# Patient Record
Sex: FEMALE | Race: BLACK OR AFRICAN AMERICAN | NOT HISPANIC OR LATINO | Employment: OTHER | URBAN - METROPOLITAN AREA
[De-identification: names, ages, dates, MRNs, and addresses within clinical notes are randomized per-mention and may not be internally consistent; named-entity substitution may affect disease eponyms.]

---

## 2021-01-01 ENCOUNTER — HOSPITAL ENCOUNTER (INPATIENT)
Facility: HOSPITAL | Age: 56
LOS: 8 days | Discharge: NURSING, REHAB OR LONG-TERM CARE | End: 2021-11-19
Attending: EMERGENCY MEDICINE | Admitting: INTERNAL MEDICINE

## 2021-01-01 ENCOUNTER — ANESTHESIA EVENT (OUTPATIENT)
Dept: GASTROENTEROLOGY | Facility: HOSPITAL | Age: 56
End: 2021-01-01

## 2021-01-01 ENCOUNTER — TELEPHONE (OUTPATIENT)
Dept: GASTROENTEROLOGY | Facility: CLINIC | Age: 56
End: 2021-01-01

## 2021-01-01 ENCOUNTER — HOSPITAL ENCOUNTER (EMERGENCY)
Facility: HOSPITAL | Age: 56
Discharge: HOME | End: 2021-11-27
Attending: EMERGENCY MEDICINE | Admitting: EMERGENCY MEDICINE

## 2021-01-01 ENCOUNTER — ANESTHESIA (OUTPATIENT)
Dept: GASTROENTEROLOGY | Facility: HOSPITAL | Age: 56
End: 2021-01-01

## 2021-01-01 ENCOUNTER — APPOINTMENT (OUTPATIENT)
Dept: CT IMAGING | Facility: HOSPITAL | Age: 56
End: 2021-01-01

## 2021-01-01 ENCOUNTER — APPOINTMENT (OUTPATIENT)
Dept: GENERAL RADIOLOGY | Facility: HOSPITAL | Age: 56
End: 2021-01-01

## 2021-01-01 VITALS
BODY MASS INDEX: 24.91 KG/M2 | DIASTOLIC BLOOD PRESSURE: 92 MMHG | WEIGHT: 135.36 LBS | HEIGHT: 62 IN | HEART RATE: 110 BPM | SYSTOLIC BLOOD PRESSURE: 112 MMHG | RESPIRATION RATE: 20 BRPM | TEMPERATURE: 98.4 F | OXYGEN SATURATION: 99 %

## 2021-01-01 VITALS
OXYGEN SATURATION: 96 % | HEART RATE: 106 BPM | SYSTOLIC BLOOD PRESSURE: 124 MMHG | WEIGHT: 142.2 LBS | DIASTOLIC BLOOD PRESSURE: 84 MMHG | TEMPERATURE: 98.8 F | HEIGHT: 62 IN | RESPIRATION RATE: 16 BRPM | BODY MASS INDEX: 26.17 KG/M2

## 2021-01-01 DIAGNOSIS — K60.2 ANAL FISSURE: Primary | ICD-10-CM

## 2021-01-01 DIAGNOSIS — R26.2 DIFFICULTY WALKING: ICD-10-CM

## 2021-01-01 DIAGNOSIS — Z78.9 DECREASED ACTIVITIES OF DAILY LIVING (ADL): ICD-10-CM

## 2021-01-01 DIAGNOSIS — K62.5 RECTAL BLEEDING: Primary | ICD-10-CM

## 2021-01-01 LAB
ABO GROUP BLD: NORMAL
ABO GROUP BLD: NORMAL
ALBUMIN SERPL-MCNC: 2.5 G/DL (ref 3.5–5.2)
ALBUMIN SERPL-MCNC: 2.7 G/DL (ref 3.5–5.2)
ALBUMIN SERPL-MCNC: 2.9 G/DL (ref 3.5–5.2)
ALBUMIN SERPL-MCNC: 3 G/DL (ref 3.5–5.2)
ALBUMIN SERPL-MCNC: 3.5 G/DL (ref 3.5–5.2)
ALBUMIN/GLOB SERPL: 1.4 G/DL
ALBUMIN/GLOB SERPL: 1.5 G/DL
ALBUMIN/GLOB SERPL: 1.6 G/DL
ALBUMIN/GLOB SERPL: 1.6 G/DL
ALBUMIN/GLOB SERPL: 1.7 G/DL
ALP SERPL-CCNC: 54 U/L (ref 39–117)
ALP SERPL-CCNC: 56 U/L (ref 39–117)
ALP SERPL-CCNC: 59 U/L (ref 39–117)
ALP SERPL-CCNC: 59 U/L (ref 39–117)
ALP SERPL-CCNC: 79 U/L (ref 39–117)
ALT SERPL W P-5'-P-CCNC: 122 U/L (ref 1–33)
ALT SERPL W P-5'-P-CCNC: 132 U/L (ref 1–33)
ALT SERPL W P-5'-P-CCNC: 133 U/L (ref 1–33)
ALT SERPL W P-5'-P-CCNC: 135 U/L (ref 1–33)
ALT SERPL W P-5'-P-CCNC: 149 U/L (ref 1–33)
ANION GAP SERPL CALCULATED.3IONS-SCNC: 10.1 MMOL/L (ref 5–15)
ANION GAP SERPL CALCULATED.3IONS-SCNC: 10.2 MMOL/L (ref 5–15)
ANION GAP SERPL CALCULATED.3IONS-SCNC: 10.4 MMOL/L (ref 5–15)
ANION GAP SERPL CALCULATED.3IONS-SCNC: 12.5 MMOL/L (ref 5–15)
ANION GAP SERPL CALCULATED.3IONS-SCNC: 5.7 MMOL/L (ref 5–15)
ANION GAP SERPL CALCULATED.3IONS-SCNC: 6.4 MMOL/L (ref 5–15)
ANION GAP SERPL CALCULATED.3IONS-SCNC: 8.4 MMOL/L (ref 5–15)
ANION GAP SERPL CALCULATED.3IONS-SCNC: 8.7 MMOL/L (ref 5–15)
APTT PPP: 18.5 SECONDS (ref 22.2–34.2)
AST SERPL-CCNC: 100 U/L (ref 1–32)
AST SERPL-CCNC: 120 U/L (ref 1–32)
AST SERPL-CCNC: 80 U/L (ref 1–32)
AST SERPL-CCNC: 91 U/L (ref 1–32)
AST SERPL-CCNC: 94 U/L (ref 1–32)
BACTERIA SPEC AEROBE CULT: ABNORMAL
BACTERIA SPEC AEROBE CULT: NORMAL
BACTERIA SPEC AEROBE CULT: NORMAL
BACTERIA UR QL AUTO: ABNORMAL /HPF
BASOPHILS # BLD AUTO: 0.01 10*3/MM3 (ref 0–0.2)
BASOPHILS # BLD AUTO: 0.05 10*3/MM3 (ref 0–0.2)
BASOPHILS NFR BLD AUTO: 0.1 % (ref 0–1.5)
BASOPHILS NFR BLD AUTO: 0.3 % (ref 0–1.5)
BILIRUB SERPL-MCNC: 0.3 MG/DL (ref 0–1.2)
BILIRUB SERPL-MCNC: 0.4 MG/DL (ref 0–1.2)
BILIRUB SERPL-MCNC: 0.4 MG/DL (ref 0–1.2)
BILIRUB SERPL-MCNC: 0.5 MG/DL (ref 0–1.2)
BILIRUB SERPL-MCNC: 0.6 MG/DL (ref 0–1.2)
BILIRUB UR QL STRIP: NEGATIVE
BILIRUB UR QL STRIP: NEGATIVE
BLD GP AB SCN SERPL QL: NEGATIVE
BUN SERPL-MCNC: 10 MG/DL (ref 6–20)
BUN SERPL-MCNC: 12 MG/DL (ref 6–20)
BUN SERPL-MCNC: 15 MG/DL (ref 6–20)
BUN SERPL-MCNC: 15 MG/DL (ref 6–20)
BUN SERPL-MCNC: 4 MG/DL (ref 6–20)
BUN SERPL-MCNC: 6 MG/DL (ref 6–20)
BUN SERPL-MCNC: 8 MG/DL (ref 6–20)
BUN SERPL-MCNC: 9 MG/DL (ref 6–20)
BUN/CREAT SERPL: 31.6 (ref 7–25)
BUN/CREAT SERPL: 32.1 (ref 7–25)
BUN/CREAT SERPL: 40 (ref 7–25)
BUN/CREAT SERPL: 54.5 (ref 7–25)
BUN/CREAT SERPL: 57.7 (ref 7–25)
BUN/CREAT SERPL: 71.4 (ref 7–25)
BUN/CREAT SERPL: ABNORMAL
BUN/CREAT SERPL: ABNORMAL
CALCIUM SPEC-SCNC: 7.5 MG/DL (ref 8.6–10.5)
CALCIUM SPEC-SCNC: 7.7 MG/DL (ref 8.6–10.5)
CALCIUM SPEC-SCNC: 7.9 MG/DL (ref 8.6–10.5)
CALCIUM SPEC-SCNC: 8 MG/DL (ref 8.6–10.5)
CALCIUM SPEC-SCNC: 8.2 MG/DL (ref 8.6–10.5)
CALCIUM SPEC-SCNC: 8.5 MG/DL (ref 8.6–10.5)
CALCIUM SPEC-SCNC: 8.6 MG/DL (ref 8.6–10.5)
CALCIUM SPEC-SCNC: 9 MG/DL (ref 8.6–10.5)
CHLORIDE SERPL-SCNC: 101 MMOL/L (ref 98–107)
CHLORIDE SERPL-SCNC: 102 MMOL/L (ref 98–107)
CHLORIDE SERPL-SCNC: 103 MMOL/L (ref 98–107)
CHLORIDE SERPL-SCNC: 104 MMOL/L (ref 98–107)
CHLORIDE SERPL-SCNC: 104 MMOL/L (ref 98–107)
CHLORIDE SERPL-SCNC: 98 MMOL/L (ref 98–107)
CK SERPL-CCNC: 1505 U/L (ref 20–180)
CK SERPL-CCNC: 1613 U/L (ref 20–180)
CK SERPL-CCNC: 2190 U/L (ref 20–180)
CK SERPL-CCNC: 2448 U/L (ref 20–180)
CK SERPL-CCNC: 2611 U/L (ref 20–180)
CK SERPL-CCNC: 2680 U/L (ref 20–180)
CK SERPL-CCNC: 2818 U/L (ref 20–180)
CK SERPL-CCNC: 2934 U/L (ref 20–180)
CLARITY UR: ABNORMAL
CLARITY UR: CLEAR
CO2 SERPL-SCNC: 23.6 MMOL/L (ref 22–29)
CO2 SERPL-SCNC: 27.6 MMOL/L (ref 22–29)
CO2 SERPL-SCNC: 27.8 MMOL/L (ref 22–29)
CO2 SERPL-SCNC: 27.9 MMOL/L (ref 22–29)
CO2 SERPL-SCNC: 28.3 MMOL/L (ref 22–29)
CO2 SERPL-SCNC: 28.5 MMOL/L (ref 22–29)
CO2 SERPL-SCNC: 29.3 MMOL/L (ref 22–29)
CO2 SERPL-SCNC: 31.6 MMOL/L (ref 22–29)
COLOR UR: YELLOW
COLOR UR: YELLOW
CREAT SERPL-MCNC: 0.19 MG/DL (ref 0.57–1)
CREAT SERPL-MCNC: 0.21 MG/DL (ref 0.57–1)
CREAT SERPL-MCNC: 0.22 MG/DL (ref 0.57–1)
CREAT SERPL-MCNC: 0.25 MG/DL (ref 0.57–1)
CREAT SERPL-MCNC: 0.26 MG/DL (ref 0.57–1)
CREAT SERPL-MCNC: 0.28 MG/DL (ref 0.57–1)
CREAT SERPL-MCNC: <0.17 MG/DL (ref 0.57–1)
CREAT SERPL-MCNC: <0.17 MG/DL (ref 0.57–1)
CRP SERPL-MCNC: 0.46 MG/DL (ref 0–0.5)
CYTO UR: NORMAL
D-LACTATE SERPL-SCNC: 1.9 MMOL/L (ref 0.5–2)
D-LACTATE SERPL-SCNC: 1.9 MMOL/L (ref 0.5–2)
DEPRECATED RDW RBC AUTO: 44.2 FL (ref 37–54)
DEPRECATED RDW RBC AUTO: 47.2 FL (ref 37–54)
DEPRECATED RDW RBC AUTO: 48.2 FL (ref 37–54)
DEPRECATED RDW RBC AUTO: 48.3 FL (ref 37–54)
DEPRECATED RDW RBC AUTO: 48.4 FL (ref 37–54)
DEPRECATED RDW RBC AUTO: 48.6 FL (ref 37–54)
DEPRECATED RDW RBC AUTO: 51.4 FL (ref 37–54)
DEPRECATED RDW RBC AUTO: 53.2 FL (ref 37–54)
DEPRECATED RDW RBC AUTO: 54.4 FL (ref 37–54)
DEPRECATED RDW RBC AUTO: 55.8 FL (ref 37–54)
EOSINOPHIL # BLD AUTO: 0 10*3/MM3 (ref 0–0.4)
EOSINOPHIL # BLD AUTO: 0 10*3/MM3 (ref 0–0.4)
EOSINOPHIL NFR BLD AUTO: 0 % (ref 0.3–6.2)
EOSINOPHIL NFR BLD AUTO: 0 % (ref 0.3–6.2)
ERYTHROCYTE [DISTWIDTH] IN BLOOD BY AUTOMATED COUNT: 16.9 % (ref 12.3–15.4)
ERYTHROCYTE [DISTWIDTH] IN BLOOD BY AUTOMATED COUNT: 17 % (ref 12.3–15.4)
ERYTHROCYTE [DISTWIDTH] IN BLOOD BY AUTOMATED COUNT: 17.1 % (ref 12.3–15.4)
ERYTHROCYTE [DISTWIDTH] IN BLOOD BY AUTOMATED COUNT: 17.3 % (ref 12.3–15.4)
ERYTHROCYTE [DISTWIDTH] IN BLOOD BY AUTOMATED COUNT: 17.3 % (ref 12.3–15.4)
ERYTHROCYTE [DISTWIDTH] IN BLOOD BY AUTOMATED COUNT: 18.3 % (ref 12.3–15.4)
ERYTHROCYTE [DISTWIDTH] IN BLOOD BY AUTOMATED COUNT: 19 % (ref 12.3–15.4)
ERYTHROCYTE [DISTWIDTH] IN BLOOD BY AUTOMATED COUNT: 19.2 % (ref 12.3–15.4)
ERYTHROCYTE [DISTWIDTH] IN BLOOD BY AUTOMATED COUNT: 19.6 % (ref 12.3–15.4)
ERYTHROCYTE [DISTWIDTH] IN BLOOD BY AUTOMATED COUNT: 19.8 % (ref 12.3–15.4)
GFR SERPL CREATININE-BSD FRML MDRD: >150 ML/MIN/1.73
GLOBULIN UR ELPH-MCNC: 1.5 GM/DL
GLOBULIN UR ELPH-MCNC: 1.7 GM/DL
GLOBULIN UR ELPH-MCNC: 1.9 GM/DL
GLOBULIN UR ELPH-MCNC: 1.9 GM/DL
GLOBULIN UR ELPH-MCNC: 2.5 GM/DL
GLUCOSE SERPL-MCNC: 113 MG/DL (ref 65–99)
GLUCOSE SERPL-MCNC: 136 MG/DL (ref 65–99)
GLUCOSE SERPL-MCNC: 74 MG/DL (ref 65–99)
GLUCOSE SERPL-MCNC: 82 MG/DL (ref 65–99)
GLUCOSE SERPL-MCNC: 82 MG/DL (ref 65–99)
GLUCOSE SERPL-MCNC: 88 MG/DL (ref 65–99)
GLUCOSE SERPL-MCNC: 94 MG/DL (ref 65–99)
GLUCOSE SERPL-MCNC: 95 MG/DL (ref 65–99)
GLUCOSE UR STRIP-MCNC: NEGATIVE MG/DL
GLUCOSE UR STRIP-MCNC: NEGATIVE MG/DL
HCT VFR BLD AUTO: 26.7 % (ref 34–46.6)
HCT VFR BLD AUTO: 28 % (ref 34–46.6)
HCT VFR BLD AUTO: 28.8 % (ref 34–46.6)
HCT VFR BLD AUTO: 29.4 % (ref 34–46.6)
HCT VFR BLD AUTO: 29.5 % (ref 34–46.6)
HCT VFR BLD AUTO: 30.5 % (ref 34–46.6)
HCT VFR BLD AUTO: 31.5 % (ref 34–46.6)
HCT VFR BLD AUTO: 32.2 % (ref 34–46.6)
HCT VFR BLD AUTO: 36.5 % (ref 34–46.6)
HCT VFR BLD AUTO: 36.8 % (ref 34–46.6)
HCT VFR BLD AUTO: 40.9 % (ref 34–46.6)
HCT VFR BLD AUTO: 41.4 % (ref 34–46.6)
HGB BLD-MCNC: 10 G/DL (ref 12–15.9)
HGB BLD-MCNC: 10.1 G/DL (ref 12–15.9)
HGB BLD-MCNC: 10.3 G/DL (ref 12–15.9)
HGB BLD-MCNC: 10.7 G/DL (ref 12–15.9)
HGB BLD-MCNC: 10.8 G/DL (ref 12–15.9)
HGB BLD-MCNC: 12.4 G/DL (ref 12–15.9)
HGB BLD-MCNC: 12.6 G/DL (ref 12–15.9)
HGB BLD-MCNC: 13.5 G/DL (ref 12–15.9)
HGB BLD-MCNC: 14.5 G/DL (ref 12–15.9)
HGB BLD-MCNC: 9.1 G/DL (ref 12–15.9)
HGB BLD-MCNC: 9.6 G/DL (ref 12–15.9)
HGB BLD-MCNC: 9.8 G/DL (ref 12–15.9)
HGB UR QL STRIP.AUTO: NEGATIVE
HGB UR QL STRIP.AUTO: NEGATIVE
HOLD SPECIMEN: NORMAL
HYALINE CASTS UR QL AUTO: ABNORMAL /LPF
IMM GRANULOCYTES # BLD AUTO: 0.03 10*3/MM3 (ref 0–0.05)
IMM GRANULOCYTES # BLD AUTO: 0.15 10*3/MM3 (ref 0–0.05)
IMM GRANULOCYTES NFR BLD AUTO: 0.3 % (ref 0–0.5)
IMM GRANULOCYTES NFR BLD AUTO: 0.8 % (ref 0–0.5)
INR PPP: 1 (ref 2–3)
KETONES UR QL STRIP: NEGATIVE
KETONES UR QL STRIP: NEGATIVE
LAB AP CASE REPORT: NORMAL
LAB AP CLINICAL INFORMATION: NORMAL
LAB AP SPECIAL STAINS: NORMAL
LEUKOCYTE ESTERASE UR QL STRIP.AUTO: ABNORMAL
LEUKOCYTE ESTERASE UR QL STRIP.AUTO: NEGATIVE
LYMPHOCYTES # BLD AUTO: 1.63 10*3/MM3 (ref 0.7–3.1)
LYMPHOCYTES # BLD AUTO: 3.45 10*3/MM3 (ref 0.7–3.1)
LYMPHOCYTES NFR BLD AUTO: 16.3 % (ref 19.6–45.3)
LYMPHOCYTES NFR BLD AUTO: 18.7 % (ref 19.6–45.3)
MAGNESIUM SERPL-MCNC: 1.8 MG/DL (ref 1.6–2.6)
MAGNESIUM SERPL-MCNC: 1.9 MG/DL (ref 1.6–2.6)
MAGNESIUM SERPL-MCNC: 2 MG/DL (ref 1.6–2.6)
MAGNESIUM SERPL-MCNC: 2.1 MG/DL (ref 1.6–2.6)
MCH RBC QN AUTO: 27.2 PG (ref 26.6–33)
MCH RBC QN AUTO: 27.2 PG (ref 26.6–33)
MCH RBC QN AUTO: 27.3 PG (ref 26.6–33)
MCH RBC QN AUTO: 27.5 PG (ref 26.6–33)
MCH RBC QN AUTO: 27.5 PG (ref 26.6–33)
MCH RBC QN AUTO: 27.6 PG (ref 26.6–33)
MCH RBC QN AUTO: 27.7 PG (ref 26.6–33)
MCH RBC QN AUTO: 27.8 PG (ref 26.6–33)
MCHC RBC AUTO-ENTMCNC: 33 G/DL (ref 31.5–35.7)
MCHC RBC AUTO-ENTMCNC: 33.2 G/DL (ref 31.5–35.7)
MCHC RBC AUTO-ENTMCNC: 33.8 G/DL (ref 31.5–35.7)
MCHC RBC AUTO-ENTMCNC: 33.9 G/DL (ref 31.5–35.7)
MCHC RBC AUTO-ENTMCNC: 34 G/DL (ref 31.5–35.7)
MCHC RBC AUTO-ENTMCNC: 34.1 G/DL (ref 31.5–35.7)
MCHC RBC AUTO-ENTMCNC: 34.3 G/DL (ref 31.5–35.7)
MCHC RBC AUTO-ENTMCNC: 34.3 G/DL (ref 31.5–35.7)
MCHC RBC AUTO-ENTMCNC: 34.4 G/DL (ref 31.5–35.7)
MCHC RBC AUTO-ENTMCNC: 35 G/DL (ref 31.5–35.7)
MCV RBC AUTO: 77.7 FL (ref 79–97)
MCV RBC AUTO: 80.4 FL (ref 79–97)
MCV RBC AUTO: 80.6 FL (ref 79–97)
MCV RBC AUTO: 80.9 FL (ref 79–97)
MCV RBC AUTO: 81 FL (ref 79–97)
MCV RBC AUTO: 81.3 FL (ref 79–97)
MCV RBC AUTO: 82.3 FL (ref 79–97)
MCV RBC AUTO: 83 FL (ref 79–97)
MONOCYTES # BLD AUTO: 0.23 10*3/MM3 (ref 0.1–0.9)
MONOCYTES # BLD AUTO: 1.06 10*3/MM3 (ref 0.1–0.9)
MONOCYTES NFR BLD AUTO: 2.3 % (ref 5–12)
MONOCYTES NFR BLD AUTO: 5.8 % (ref 5–12)
NEUTROPHILS NFR BLD AUTO: 13.7 10*3/MM3 (ref 1.7–7)
NEUTROPHILS NFR BLD AUTO: 74.4 % (ref 42.7–76)
NEUTROPHILS NFR BLD AUTO: 8.11 10*3/MM3 (ref 1.7–7)
NEUTROPHILS NFR BLD AUTO: 81 % (ref 42.7–76)
NITRITE UR QL STRIP: NEGATIVE
NITRITE UR QL STRIP: NEGATIVE
NRBC BLD AUTO-RTO: 0 /100 WBC (ref 0–0.2)
NRBC BLD AUTO-RTO: 0 /100 WBC (ref 0–0.2)
PATH REPORT.FINAL DX SPEC: NORMAL
PATH REPORT.GROSS SPEC: NORMAL
PH UR STRIP.AUTO: 6.5 [PH] (ref 5–8)
PH UR STRIP.AUTO: 8 [PH] (ref 5–8)
PHOSPHATE SERPL-MCNC: 3.8 MG/DL (ref 2.5–4.5)
PLATELET # BLD AUTO: 261 10*3/MM3 (ref 140–450)
PLATELET # BLD AUTO: 284 10*3/MM3 (ref 140–450)
PLATELET # BLD AUTO: 299 10*3/MM3 (ref 140–450)
PLATELET # BLD AUTO: 308 10*3/MM3 (ref 140–450)
PLATELET # BLD AUTO: 340 10*3/MM3 (ref 140–450)
PLATELET # BLD AUTO: 372 10*3/MM3 (ref 140–450)
PLATELET # BLD AUTO: 436 10*3/MM3 (ref 140–450)
PLATELET # BLD AUTO: 453 10*3/MM3 (ref 140–450)
PLATELET # BLD AUTO: 495 10*3/MM3 (ref 140–450)
PLATELET # BLD AUTO: 534 10*3/MM3 (ref 140–450)
PMV BLD AUTO: 8.1 FL (ref 6–12)
PMV BLD AUTO: 8.5 FL (ref 6–12)
PMV BLD AUTO: 8.6 FL (ref 6–12)
PMV BLD AUTO: 8.9 FL (ref 6–12)
PMV BLD AUTO: 8.9 FL (ref 6–12)
PMV BLD AUTO: 9.1 FL (ref 6–12)
POTASSIUM SERPL-SCNC: 3 MMOL/L (ref 3.5–5.2)
POTASSIUM SERPL-SCNC: 3.3 MMOL/L (ref 3.5–5.2)
POTASSIUM SERPL-SCNC: 3.4 MMOL/L (ref 3.5–5.2)
POTASSIUM SERPL-SCNC: 3.5 MMOL/L (ref 3.5–5.2)
POTASSIUM SERPL-SCNC: 3.7 MMOL/L (ref 3.5–5.2)
POTASSIUM SERPL-SCNC: 4 MMOL/L (ref 3.5–5.2)
POTASSIUM SERPL-SCNC: 4.1 MMOL/L (ref 3.5–5.2)
POTASSIUM SERPL-SCNC: 4.5 MMOL/L (ref 3.5–5.2)
PROT SERPL-MCNC: 4 G/DL (ref 6–8.5)
PROT SERPL-MCNC: 4.4 G/DL (ref 6–8.5)
PROT SERPL-MCNC: 4.8 G/DL (ref 6–8.5)
PROT SERPL-MCNC: 4.9 G/DL (ref 6–8.5)
PROT SERPL-MCNC: 6 G/DL (ref 6–8.5)
PROT UR QL STRIP: ABNORMAL
PROT UR QL STRIP: ABNORMAL
PROTHROMBIN TIME: 10.5 SECONDS (ref 9.4–12)
QT INTERVAL: 349 MS
RBC # BLD AUTO: 3.3 10*6/MM3 (ref 3.77–5.28)
RBC # BLD AUTO: 3.46 10*6/MM3 (ref 3.77–5.28)
RBC # BLD AUTO: 3.56 10*6/MM3 (ref 3.77–5.28)
RBC # BLD AUTO: 3.63 10*6/MM3 (ref 3.77–5.28)
RBC # BLD AUTO: 3.66 10*6/MM3 (ref 3.77–5.28)
RBC # BLD AUTO: 3.75 10*6/MM3 (ref 3.77–5.28)
RBC # BLD AUTO: 3.88 10*6/MM3 (ref 3.77–5.28)
RBC # BLD AUTO: 3.92 10*6/MM3 (ref 3.77–5.28)
RBC # BLD AUTO: 4.97 10*6/MM3 (ref 3.77–5.28)
RBC # BLD AUTO: 5.33 10*6/MM3 (ref 3.77–5.28)
RBC # UR STRIP: ABNORMAL /HPF
REF LAB TEST METHOD: ABNORMAL
RH BLD: NEGATIVE
RH BLD: NEGATIVE
SARS-COV-2 N GENE RESP QL NAA+PROBE: NOT DETECTED
SODIUM SERPL-SCNC: 138 MMOL/L (ref 136–145)
SODIUM SERPL-SCNC: 138 MMOL/L (ref 136–145)
SODIUM SERPL-SCNC: 139 MMOL/L (ref 136–145)
SODIUM SERPL-SCNC: 140 MMOL/L (ref 136–145)
SODIUM SERPL-SCNC: 141 MMOL/L (ref 136–145)
SODIUM SERPL-SCNC: 141 MMOL/L (ref 136–145)
SP GR UR STRIP: 1.01 (ref 1–1.03)
SP GR UR STRIP: 1.02 (ref 1–1.03)
SQUAMOUS #/AREA URNS HPF: ABNORMAL /HPF
T&S EXPIRATION DATE: NORMAL
UROBILINOGEN UR QL STRIP: ABNORMAL
UROBILINOGEN UR QL STRIP: ABNORMAL
WBC # BLD AUTO: 10.03 10*3/MM3 (ref 3.4–10.8)
WBC # BLD AUTO: 11.13 10*3/MM3 (ref 3.4–10.8)
WBC # BLD AUTO: 11.4 10*3/MM3 (ref 3.4–10.8)
WBC # BLD AUTO: 18.41 10*3/MM3 (ref 3.4–10.8)
WBC # BLD AUTO: 18.69 10*3/MM3 (ref 3.4–10.8)
WBC # BLD AUTO: 9.35 10*3/MM3 (ref 3.4–10.8)
WBC # BLD AUTO: 9.67 10*3/MM3 (ref 3.4–10.8)
WBC # UR STRIP: ABNORMAL /HPF
WBC NRBC COR # BLD: 10.01 10*3/MM3 (ref 3.4–10.8)
WBC NRBC COR # BLD: 9.41 10*3/MM3 (ref 3.4–10.8)
WBC NRBC COR # BLD: 9.88 10*3/MM3 (ref 3.4–10.8)
WHOLE BLOOD HOLD SPECIMEN: NORMAL

## 2021-01-01 PROCEDURE — 87186 SC STD MICRODIL/AGAR DIL: CPT | Performed by: INTERNAL MEDICINE

## 2021-01-01 PROCEDURE — 83605 ASSAY OF LACTIC ACID: CPT | Performed by: EMERGENCY MEDICINE

## 2021-01-01 PROCEDURE — 63710000001 PREDNISONE PER 5 MG: Performed by: INTERNAL MEDICINE

## 2021-01-01 PROCEDURE — 99223 1ST HOSP IP/OBS HIGH 75: CPT | Performed by: INTERNAL MEDICINE

## 2021-01-01 PROCEDURE — 86850 RBC ANTIBODY SCREEN: CPT | Performed by: EMERGENCY MEDICINE

## 2021-01-01 PROCEDURE — 99232 SBSQ HOSP IP/OBS MODERATE 35: CPT | Performed by: INTERNAL MEDICINE

## 2021-01-01 PROCEDURE — 85027 COMPLETE CBC AUTOMATED: CPT | Performed by: INTERNAL MEDICINE

## 2021-01-01 PROCEDURE — 99233 SBSQ HOSP IP/OBS HIGH 50: CPT | Performed by: FAMILY MEDICINE

## 2021-01-01 PROCEDURE — 63710000001 PREDNISONE PER 5 MG: Performed by: FAMILY MEDICINE

## 2021-01-01 PROCEDURE — 99285 EMERGENCY DEPT VISIT HI MDM: CPT

## 2021-01-01 PROCEDURE — G0378 HOSPITAL OBSERVATION PER HR: HCPCS

## 2021-01-01 PROCEDURE — 71045 X-RAY EXAM CHEST 1 VIEW: CPT

## 2021-01-01 PROCEDURE — 99284 EMERGENCY DEPT VISIT MOD MDM: CPT

## 2021-01-01 PROCEDURE — 83735 ASSAY OF MAGNESIUM: CPT | Performed by: INTERNAL MEDICINE

## 2021-01-01 PROCEDURE — 80053 COMPREHEN METABOLIC PANEL: CPT | Performed by: FAMILY MEDICINE

## 2021-01-01 PROCEDURE — 63710000001 PREDNISONE PER 1 MG: Performed by: INTERNAL MEDICINE

## 2021-01-01 PROCEDURE — 84100 ASSAY OF PHOSPHORUS: CPT | Performed by: EMERGENCY MEDICINE

## 2021-01-01 PROCEDURE — 86140 C-REACTIVE PROTEIN: CPT | Performed by: EMERGENCY MEDICINE

## 2021-01-01 PROCEDURE — 86900 BLOOD TYPING SEROLOGIC ABO: CPT

## 2021-01-01 PROCEDURE — 25010000002 CEFTRIAXONE PER 250 MG: Performed by: INTERNAL MEDICINE

## 2021-01-01 PROCEDURE — 97110 THERAPEUTIC EXERCISES: CPT

## 2021-01-01 PROCEDURE — 88342 IMHCHEM/IMCYTCHM 1ST ANTB: CPT | Performed by: INTERNAL MEDICINE

## 2021-01-01 PROCEDURE — 80053 COMPREHEN METABOLIC PANEL: CPT | Performed by: EMERGENCY MEDICINE

## 2021-01-01 PROCEDURE — 82550 ASSAY OF CK (CPK): CPT | Performed by: INTERNAL MEDICINE

## 2021-01-01 PROCEDURE — 97161 PT EVAL LOW COMPLEX 20 MIN: CPT

## 2021-01-01 PROCEDURE — 87635 SARS-COV-2 COVID-19 AMP PRB: CPT | Performed by: INTERNAL MEDICINE

## 2021-01-01 PROCEDURE — 82550 ASSAY OF CK (CPK): CPT | Performed by: FAMILY MEDICINE

## 2021-01-01 PROCEDURE — 87077 CULTURE AEROBIC IDENTIFY: CPT | Performed by: INTERNAL MEDICINE

## 2021-01-01 PROCEDURE — 93005 ELECTROCARDIOGRAM TRACING: CPT | Performed by: EMERGENCY MEDICINE

## 2021-01-01 PROCEDURE — 85730 THROMBOPLASTIN TIME PARTIAL: CPT | Performed by: EMERGENCY MEDICINE

## 2021-01-01 PROCEDURE — 83735 ASSAY OF MAGNESIUM: CPT | Performed by: FAMILY MEDICINE

## 2021-01-01 PROCEDURE — 85027 COMPLETE CBC AUTOMATED: CPT | Performed by: FAMILY MEDICINE

## 2021-01-01 PROCEDURE — 88341 IMHCHEM/IMCYTCHM EA ADD ANTB: CPT | Performed by: INTERNAL MEDICINE

## 2021-01-01 PROCEDURE — 81003 URINALYSIS AUTO W/O SCOPE: CPT | Performed by: EMERGENCY MEDICINE

## 2021-01-01 PROCEDURE — 25010000002 PROPOFOL 10 MG/ML EMULSION: Performed by: NURSE ANESTHETIST, CERTIFIED REGISTERED

## 2021-01-01 PROCEDURE — 80048 BASIC METABOLIC PNL TOTAL CA: CPT | Performed by: INTERNAL MEDICINE

## 2021-01-01 PROCEDURE — 86901 BLOOD TYPING SEROLOGIC RH(D): CPT | Performed by: EMERGENCY MEDICINE

## 2021-01-01 PROCEDURE — 99231 SBSQ HOSP IP/OBS SF/LOW 25: CPT | Performed by: INTERNAL MEDICINE

## 2021-01-01 PROCEDURE — 85025 COMPLETE CBC W/AUTO DIFF WBC: CPT | Performed by: EMERGENCY MEDICINE

## 2021-01-01 PROCEDURE — 88305 TISSUE EXAM BY PATHOLOGIST: CPT | Performed by: INTERNAL MEDICINE

## 2021-01-01 PROCEDURE — 86901 BLOOD TYPING SEROLOGIC RH(D): CPT

## 2021-01-01 PROCEDURE — 45380 COLONOSCOPY AND BIOPSY: CPT | Performed by: INTERNAL MEDICINE

## 2021-01-01 PROCEDURE — 97530 THERAPEUTIC ACTIVITIES: CPT

## 2021-01-01 PROCEDURE — 85610 PROTHROMBIN TIME: CPT | Performed by: EMERGENCY MEDICINE

## 2021-01-01 PROCEDURE — 87086 URINE CULTURE/COLONY COUNT: CPT | Performed by: INTERNAL MEDICINE

## 2021-01-01 PROCEDURE — 85018 HEMOGLOBIN: CPT | Performed by: FAMILY MEDICINE

## 2021-01-01 PROCEDURE — 74176 CT ABD & PELVIS W/O CONTRAST: CPT

## 2021-01-01 PROCEDURE — 80053 COMPREHEN METABOLIC PANEL: CPT | Performed by: INTERNAL MEDICINE

## 2021-01-01 PROCEDURE — 0 POTASSIUM CHLORIDE 10 MEQ/100ML SOLUTION: Performed by: FAMILY MEDICINE

## 2021-01-01 PROCEDURE — 85025 COMPLETE CBC W/AUTO DIFF WBC: CPT

## 2021-01-01 PROCEDURE — 86900 BLOOD TYPING SEROLOGIC ABO: CPT | Performed by: EMERGENCY MEDICINE

## 2021-01-01 PROCEDURE — 0DBP8ZX EXCISION OF RECTUM, VIA NATURAL OR ARTIFICIAL OPENING ENDOSCOPIC, DIAGNOSTIC: ICD-10-PCS | Performed by: INTERNAL MEDICINE

## 2021-01-01 PROCEDURE — 97165 OT EVAL LOW COMPLEX 30 MIN: CPT

## 2021-01-01 PROCEDURE — 81001 URINALYSIS AUTO W/SCOPE: CPT | Performed by: INTERNAL MEDICINE

## 2021-01-01 PROCEDURE — 85014 HEMATOCRIT: CPT | Performed by: FAMILY MEDICINE

## 2021-01-01 PROCEDURE — 93010 ELECTROCARDIOGRAM REPORT: CPT | Performed by: INTERNAL MEDICINE

## 2021-01-01 PROCEDURE — 99222 1ST HOSP IP/OBS MODERATE 55: CPT | Performed by: INTERNAL MEDICINE

## 2021-01-01 PROCEDURE — 99239 HOSP IP/OBS DSCHRG MGMT >30: CPT | Performed by: INTERNAL MEDICINE

## 2021-01-01 PROCEDURE — 25010000002 SODIUM CHLORIDE 0.9 % WITH KCL 20 MEQ 20-0.9 MEQ/L-% SOLUTION: Performed by: INTERNAL MEDICINE

## 2021-01-01 PROCEDURE — 87040 BLOOD CULTURE FOR BACTERIA: CPT | Performed by: EMERGENCY MEDICINE

## 2021-01-01 PROCEDURE — 97535 SELF CARE MNGMENT TRAINING: CPT

## 2021-01-01 PROCEDURE — 83735 ASSAY OF MAGNESIUM: CPT | Performed by: EMERGENCY MEDICINE

## 2021-01-01 PROCEDURE — 36415 COLL VENOUS BLD VENIPUNCTURE: CPT | Performed by: EMERGENCY MEDICINE

## 2021-01-01 RX ORDER — MINERAL OIL, PETROLATUM 425; 568 MG/G; MG/G
OINTMENT OPHTHALMIC
Status: DISCONTINUED | OUTPATIENT
Start: 2021-01-01 | End: 2021-01-01 | Stop reason: HOSPADM

## 2021-01-01 RX ORDER — PREDNISONE 20 MG/1
40 TABLET ORAL DAILY
COMMUNITY

## 2021-01-01 RX ORDER — ACETAMINOPHEN 650 MG/1
650 SUPPOSITORY RECTAL EVERY 4 HOURS PRN
Status: DISCONTINUED | OUTPATIENT
Start: 2021-01-01 | End: 2021-01-01 | Stop reason: HOSPADM

## 2021-01-01 RX ORDER — POTASSIUM CHLORIDE 1.5 G/1.77G
20 POWDER, FOR SOLUTION ORAL
Status: DISPENSED | OUTPATIENT
Start: 2021-01-01 | End: 2021-01-01

## 2021-01-01 RX ORDER — CYCLOBENZAPRINE HCL 10 MG
10 TABLET ORAL 3 TIMES DAILY PRN
Status: DISCONTINUED | OUTPATIENT
Start: 2021-01-01 | End: 2021-01-01 | Stop reason: HOSPADM

## 2021-01-01 RX ORDER — PREDNISONE 20 MG/1
60 TABLET ORAL
Status: DISCONTINUED | OUTPATIENT
Start: 2021-01-01 | End: 2021-01-01 | Stop reason: HOSPADM

## 2021-01-01 RX ORDER — SODIUM PHOSPHATE, DIBASIC AND SODIUM PHOSPHATE, MONOBASIC 7; 19 G/133ML; G/133ML
1 ENEMA RECTAL ONCE
COMMUNITY
End: 2022-01-01

## 2021-01-01 RX ORDER — WHITE PETROLATUM 57.7 %-MINERAL OIL 31.9 % EYE OINTMENT
1
Status: DISCONTINUED | OUTPATIENT
Start: 2021-01-01 | End: 2021-01-01

## 2021-01-01 RX ORDER — PROPOFOL 10 MG/ML
VIAL (ML) INTRAVENOUS AS NEEDED
Status: DISCONTINUED | OUTPATIENT
Start: 2021-01-01 | End: 2021-01-01 | Stop reason: SURG

## 2021-01-01 RX ORDER — ACETAMINOPHEN 160 MG/5ML
650 SOLUTION ORAL EVERY 4 HOURS PRN
Status: DISCONTINUED | OUTPATIENT
Start: 2021-01-01 | End: 2021-01-01 | Stop reason: HOSPADM

## 2021-01-01 RX ORDER — NITROGLYCERIN 0.4 MG/1
0.4 TABLET SUBLINGUAL
Status: DISCONTINUED | OUTPATIENT
Start: 2021-01-01 | End: 2021-01-01 | Stop reason: HOSPADM

## 2021-01-01 RX ORDER — SODIUM CHLORIDE 0.9 % (FLUSH) 0.9 %
10 SYRINGE (ML) INJECTION AS NEEDED
Status: DISCONTINUED | OUTPATIENT
Start: 2021-01-01 | End: 2021-01-01 | Stop reason: HOSPADM

## 2021-01-01 RX ORDER — POLYETHYLENE GLYCOL 3350 17 G/17G
17 POWDER, FOR SOLUTION ORAL 2 TIMES DAILY
COMMUNITY
End: 2022-01-01

## 2021-01-01 RX ORDER — HYDROCODONE BITARTRATE AND ACETAMINOPHEN 5; 325 MG/1; MG/1
1 TABLET ORAL EVERY 4 HOURS PRN
Status: DISCONTINUED | OUTPATIENT
Start: 2021-01-01 | End: 2021-01-01 | Stop reason: HOSPADM

## 2021-01-01 RX ORDER — BISACODYL 10 MG
10 SUPPOSITORY, RECTAL RECTAL DAILY
COMMUNITY
End: 2022-01-01

## 2021-01-01 RX ORDER — HYDROCODONE BITARTRATE AND ACETAMINOPHEN 10; 325 MG/1; MG/1
1 TABLET ORAL EVERY 6 HOURS PRN
COMMUNITY

## 2021-01-01 RX ORDER — ACETAMINOPHEN 325 MG/1
650 TABLET ORAL EVERY 4 HOURS PRN
Status: DISCONTINUED | OUTPATIENT
Start: 2021-01-01 | End: 2021-01-01 | Stop reason: HOSPADM

## 2021-01-01 RX ORDER — ALPRAZOLAM 0.25 MG/1
1 TABLET ORAL 2 TIMES DAILY PRN
Status: DISCONTINUED | OUTPATIENT
Start: 2021-01-01 | End: 2021-01-01 | Stop reason: HOSPADM

## 2021-01-01 RX ORDER — PANTOPRAZOLE SODIUM 40 MG/10ML
40 INJECTION, POWDER, LYOPHILIZED, FOR SOLUTION INTRAVENOUS
Status: DISCONTINUED | OUTPATIENT
Start: 2021-01-01 | End: 2021-01-01 | Stop reason: HOSPADM

## 2021-01-01 RX ORDER — PHENYLEPHRINE HCL IN 0.9% NACL 1 MG/10 ML
SYRINGE (ML) INTRAVENOUS AS NEEDED
Status: DISCONTINUED | OUTPATIENT
Start: 2021-01-01 | End: 2021-01-01 | Stop reason: SURG

## 2021-01-01 RX ORDER — CEFDINIR 300 MG/1
300 CAPSULE ORAL 2 TIMES DAILY
Qty: 10 CAPSULE | Refills: 0 | Status: SHIPPED | OUTPATIENT
Start: 2021-01-01 | End: 2021-01-01

## 2021-01-01 RX ORDER — POTASSIUM CHLORIDE 7.45 MG/ML
10 INJECTION INTRAVENOUS
Status: COMPLETED | OUTPATIENT
Start: 2021-01-01 | End: 2021-01-01

## 2021-01-01 RX ORDER — SODIUM CHLORIDE, SODIUM LACTATE, POTASSIUM CHLORIDE, CALCIUM CHLORIDE 600; 310; 30; 20 MG/100ML; MG/100ML; MG/100ML; MG/100ML
75 INJECTION, SOLUTION INTRAVENOUS CONTINUOUS
Status: DISCONTINUED | OUTPATIENT
Start: 2021-01-01 | End: 2021-01-01

## 2021-01-01 RX ORDER — LIDOCAINE HYDROCHLORIDE 20 MG/ML
INJECTION, SOLUTION INFILTRATION; PERINEURAL AS NEEDED
Status: DISCONTINUED | OUTPATIENT
Start: 2021-01-01 | End: 2021-01-01 | Stop reason: SURG

## 2021-01-01 RX ORDER — ALPRAZOLAM 1 MG/1
1 TABLET ORAL 2 TIMES DAILY PRN
COMMUNITY
End: 2022-01-01

## 2021-01-01 RX ORDER — SODIUM CHLORIDE, SODIUM LACTATE, POTASSIUM CHLORIDE, CALCIUM CHLORIDE 600; 310; 30; 20 MG/100ML; MG/100ML; MG/100ML; MG/100ML
30 INJECTION, SOLUTION INTRAVENOUS CONTINUOUS
Status: DISCONTINUED | OUTPATIENT
Start: 2021-01-01 | End: 2021-01-01

## 2021-01-01 RX ORDER — CYCLOBENZAPRINE HCL 10 MG
10 TABLET ORAL 3 TIMES DAILY PRN
COMMUNITY

## 2021-01-01 RX ORDER — IMIPRAMINE PAMOATE 100 MG/1
100 CAPSULE ORAL NIGHTLY
COMMUNITY
End: 2022-01-01

## 2021-01-01 RX ORDER — CEFTRIAXONE SODIUM 1 G/50ML
1 INJECTION, SOLUTION INTRAVENOUS EVERY 24 HOURS
Status: DISCONTINUED | OUTPATIENT
Start: 2021-01-01 | End: 2021-01-01 | Stop reason: HOSPADM

## 2021-01-01 RX ORDER — OMEPRAZOLE 20 MG/1
20 CAPSULE, DELAYED RELEASE ORAL DAILY
COMMUNITY

## 2021-01-01 RX ORDER — MIRTAZAPINE 15 MG/1
15 TABLET, FILM COATED ORAL NIGHTLY
COMMUNITY

## 2021-01-01 RX ORDER — ASPIRIN 325 MG
325 TABLET ORAL DAILY
COMMUNITY
End: 2022-01-01

## 2021-01-01 RX ORDER — MAGNESIUM CARB/ALUMINUM HYDROX 105-160MG
296 TABLET,CHEWABLE ORAL ONCE
Status: DISCONTINUED | OUTPATIENT
Start: 2021-01-01 | End: 2021-01-01

## 2021-01-01 RX ORDER — ACETAMINOPHEN 325 MG/1
650 TABLET ORAL EVERY 4 HOURS PRN
COMMUNITY
End: 2022-01-01

## 2021-01-01 RX ORDER — SODIUM CHLORIDE AND POTASSIUM CHLORIDE 150; 900 MG/100ML; MG/100ML
125 INJECTION, SOLUTION INTRAVENOUS CONTINUOUS
Status: ACTIVE | OUTPATIENT
Start: 2021-01-01 | End: 2021-01-01

## 2021-01-01 RX ORDER — SODIUM CHLORIDE 0.9 % (FLUSH) 0.9 %
10 SYRINGE (ML) INJECTION EVERY 12 HOURS SCHEDULED
Status: DISCONTINUED | OUTPATIENT
Start: 2021-01-01 | End: 2021-01-01 | Stop reason: HOSPADM

## 2021-01-01 RX ORDER — DOCUSATE SODIUM 100 MG/1
100 CAPSULE, LIQUID FILLED ORAL 2 TIMES DAILY
Status: DISCONTINUED | OUTPATIENT
Start: 2021-01-01 | End: 2021-01-01 | Stop reason: HOSPADM

## 2021-01-01 RX ADMIN — PANTOPRAZOLE SODIUM 40 MG: 40 INJECTION, POWDER, FOR SOLUTION INTRAVENOUS at 17:18

## 2021-01-01 RX ADMIN — SODIUM CHLORIDE, PRESERVATIVE FREE 10 ML: 5 INJECTION INTRAVENOUS at 20:41

## 2021-01-01 RX ADMIN — SODIUM CHLORIDE, PRESERVATIVE FREE 10 ML: 5 INJECTION INTRAVENOUS at 21:12

## 2021-01-01 RX ADMIN — POTASSIUM CHLORIDE 10 MEQ: 7.46 INJECTION, SOLUTION INTRAVENOUS at 13:38

## 2021-01-01 RX ADMIN — ACETAMINOPHEN 650 MG: 325 TABLET ORAL at 08:00

## 2021-01-01 RX ADMIN — PANTOPRAZOLE SODIUM 40 MG: 40 INJECTION, POWDER, FOR SOLUTION INTRAVENOUS at 08:16

## 2021-01-01 RX ADMIN — PANTOPRAZOLE SODIUM 40 MG: 40 INJECTION, POWDER, FOR SOLUTION INTRAVENOUS at 09:14

## 2021-01-01 RX ADMIN — CYCLOBENZAPRINE 10 MG: 10 TABLET, FILM COATED ORAL at 21:35

## 2021-01-01 RX ADMIN — PROPOFOL 200 MCG/KG/MIN: 10 INJECTION, EMULSION INTRAVENOUS at 15:44

## 2021-01-01 RX ADMIN — CYCLOBENZAPRINE 10 MG: 10 TABLET, FILM COATED ORAL at 23:35

## 2021-01-01 RX ADMIN — POTASSIUM CHLORIDE 10 MEQ: 7.46 INJECTION, SOLUTION INTRAVENOUS at 12:29

## 2021-01-01 RX ADMIN — PANTOPRAZOLE SODIUM 40 MG: 40 INJECTION, POWDER, FOR SOLUTION INTRAVENOUS at 17:11

## 2021-01-01 RX ADMIN — SODIUM CHLORIDE, PRESERVATIVE FREE 10 ML: 5 INJECTION INTRAVENOUS at 21:36

## 2021-01-01 RX ADMIN — SODIUM CHLORIDE, POTASSIUM CHLORIDE, SODIUM LACTATE AND CALCIUM CHLORIDE: 600; 310; 30; 20 INJECTION, SOLUTION INTRAVENOUS at 15:40

## 2021-01-01 RX ADMIN — POTASSIUM CHLORIDE 10 MEQ: 7.46 INJECTION, SOLUTION INTRAVENOUS at 13:30

## 2021-01-01 RX ADMIN — Medication 100 MCG: at 15:50

## 2021-01-01 RX ADMIN — CYCLOBENZAPRINE 10 MG: 10 TABLET, FILM COATED ORAL at 15:11

## 2021-01-01 RX ADMIN — PREDNISONE 60 MG: 20 TABLET ORAL at 07:30

## 2021-01-01 RX ADMIN — DOCUSATE SODIUM 100 MG: 100 CAPSULE, LIQUID FILLED ORAL at 21:36

## 2021-01-01 RX ADMIN — POTASSIUM CHLORIDE 20 MEQ: 1.5 POWDER, FOR SOLUTION ORAL at 11:47

## 2021-01-01 RX ADMIN — PANTOPRAZOLE SODIUM 40 MG: 40 INJECTION, POWDER, FOR SOLUTION INTRAVENOUS at 17:38

## 2021-01-01 RX ADMIN — CYCLOBENZAPRINE 10 MG: 10 TABLET, FILM COATED ORAL at 20:01

## 2021-01-01 RX ADMIN — SODIUM CHLORIDE, PRESERVATIVE FREE 10 ML: 5 INJECTION INTRAVENOUS at 20:02

## 2021-01-01 RX ADMIN — PREDNISONE 60 MG: 20 TABLET ORAL at 08:01

## 2021-01-01 RX ADMIN — POTASSIUM CHLORIDE 20 MEQ: 1.5 POWDER, FOR SOLUTION ORAL at 17:38

## 2021-01-01 RX ADMIN — Medication 100 MCG: at 15:55

## 2021-01-01 RX ADMIN — POTASSIUM CHLORIDE 20 MEQ: 1.5 POWDER, FOR SOLUTION ORAL at 18:39

## 2021-01-01 RX ADMIN — PANTOPRAZOLE SODIUM 40 MG: 40 INJECTION, POWDER, FOR SOLUTION INTRAVENOUS at 10:53

## 2021-01-01 RX ADMIN — Medication 200 MCG: at 15:59

## 2021-01-01 RX ADMIN — ALPRAZOLAM 1 MG: 0.25 TABLET ORAL at 20:01

## 2021-01-01 RX ADMIN — ALPRAZOLAM 1 MG: 0.25 TABLET ORAL at 00:22

## 2021-01-01 RX ADMIN — POLYETHYLENE GLYCOL 3350, SODIUM SULFATE ANHYDROUS, SODIUM BICARBONATE, SODIUM CHLORIDE, POTASSIUM CHLORIDE 4000 ML: 236; 22.74; 6.74; 5.86; 2.97 POWDER, FOR SOLUTION ORAL at 18:41

## 2021-01-01 RX ADMIN — HYDROCODONE BITARTRATE AND ACETAMINOPHEN 1 TABLET: 5; 325 TABLET ORAL at 08:42

## 2021-01-01 RX ADMIN — PREDNISONE 60 MG: 20 TABLET ORAL at 08:16

## 2021-01-01 RX ADMIN — SODIUM CHLORIDE, PRESERVATIVE FREE 10 ML: 5 INJECTION INTRAVENOUS at 08:29

## 2021-01-01 RX ADMIN — CYCLOBENZAPRINE 10 MG: 10 TABLET, FILM COATED ORAL at 23:45

## 2021-01-01 RX ADMIN — LIDOCAINE HYDROCHLORIDE 100 MG: 20 INJECTION, SOLUTION INFILTRATION; PERINEURAL at 15:44

## 2021-01-01 RX ADMIN — PROPOFOL 20 MG: 10 INJECTION, EMULSION INTRAVENOUS at 15:44

## 2021-01-01 RX ADMIN — PANTOPRAZOLE SODIUM 40 MG: 40 INJECTION, POWDER, FOR SOLUTION INTRAVENOUS at 18:40

## 2021-01-01 RX ADMIN — PREDNISONE 60 MG: 20 TABLET ORAL at 08:29

## 2021-01-01 RX ADMIN — SODIUM CHLORIDE, PRESERVATIVE FREE 10 ML: 5 INJECTION INTRAVENOUS at 20:30

## 2021-01-01 RX ADMIN — MINERAL OIL, PETROLATUM: 425; 568 OINTMENT OPHTHALMIC at 21:14

## 2021-01-01 RX ADMIN — SODIUM CHLORIDE, PRESERVATIVE FREE 10 ML: 5 INJECTION INTRAVENOUS at 08:17

## 2021-01-01 RX ADMIN — HYDROCODONE BITARTRATE AND ACETAMINOPHEN 1 TABLET: 5; 325 TABLET ORAL at 09:14

## 2021-01-01 RX ADMIN — ACETAMINOPHEN 650 MG: 325 TABLET ORAL at 00:21

## 2021-01-01 RX ADMIN — SODIUM CHLORIDE, PRESERVATIVE FREE 10 ML: 5 INJECTION INTRAVENOUS at 04:33

## 2021-01-01 RX ADMIN — ALPRAZOLAM 1 MG: 0.25 TABLET ORAL at 00:01

## 2021-01-01 RX ADMIN — SODIUM CHLORIDE, POTASSIUM CHLORIDE, SODIUM LACTATE AND CALCIUM CHLORIDE 75 ML/HR: 600; 310; 30; 20 INJECTION, SOLUTION INTRAVENOUS at 20:30

## 2021-01-01 RX ADMIN — ALPRAZOLAM 1 MG: 0.25 TABLET ORAL at 23:45

## 2021-01-01 RX ADMIN — PREDNISONE 60 MG: 20 TABLET ORAL at 08:14

## 2021-01-01 RX ADMIN — SODIUM CHLORIDE, POTASSIUM CHLORIDE, SODIUM LACTATE AND CALCIUM CHLORIDE 1989 ML: 600; 310; 30; 20 INJECTION, SOLUTION INTRAVENOUS at 00:18

## 2021-01-01 RX ADMIN — TRANEXAMIC ACID 1000 MG: 100 INJECTION, SOLUTION INTRAVENOUS at 01:12

## 2021-01-01 RX ADMIN — ALPRAZOLAM 1 MG: 0.25 TABLET ORAL at 23:35

## 2021-01-01 RX ADMIN — PANTOPRAZOLE SODIUM 40 MG: 40 INJECTION, POWDER, FOR SOLUTION INTRAVENOUS at 08:28

## 2021-01-01 RX ADMIN — SODIUM CHLORIDE, PRESERVATIVE FREE 10 ML: 5 INJECTION INTRAVENOUS at 10:52

## 2021-01-01 RX ADMIN — CEFTRIAXONE SODIUM 1 G: 1 INJECTION, SOLUTION INTRAVENOUS at 16:02

## 2021-01-01 RX ADMIN — SODIUM CHLORIDE, POTASSIUM CHLORIDE, SODIUM LACTATE AND CALCIUM CHLORIDE 75 ML/HR: 600; 310; 30; 20 INJECTION, SOLUTION INTRAVENOUS at 08:09

## 2021-01-01 RX ADMIN — PANTOPRAZOLE SODIUM 40 MG: 40 INJECTION, POWDER, FOR SOLUTION INTRAVENOUS at 16:58

## 2021-01-01 RX ADMIN — CYCLOBENZAPRINE 10 MG: 10 TABLET, FILM COATED ORAL at 00:01

## 2021-01-01 RX ADMIN — SODIUM CHLORIDE, PRESERVATIVE FREE 10 ML: 5 INJECTION INTRAVENOUS at 20:45

## 2021-01-01 RX ADMIN — PANTOPRAZOLE SODIUM 40 MG: 40 INJECTION, POWDER, FOR SOLUTION INTRAVENOUS at 17:32

## 2021-01-01 RX ADMIN — ALPRAZOLAM 1 MG: 0.25 TABLET ORAL at 21:35

## 2021-01-01 RX ADMIN — CYCLOBENZAPRINE 10 MG: 10 TABLET, FILM COATED ORAL at 00:34

## 2021-01-01 RX ADMIN — ACETAMINOPHEN 650 MG: 325 TABLET ORAL at 21:40

## 2021-01-01 RX ADMIN — SODIUM CHLORIDE, PRESERVATIVE FREE 10 ML: 5 INJECTION INTRAVENOUS at 08:14

## 2021-01-01 RX ADMIN — ALPRAZOLAM 1 MG: 0.25 TABLET ORAL at 06:35

## 2021-01-01 RX ADMIN — POTASSIUM CHLORIDE AND SODIUM CHLORIDE 125 ML/HR: 900; 150 INJECTION, SOLUTION INTRAVENOUS at 04:34

## 2021-01-01 RX ADMIN — SODIUM CHLORIDE, PRESERVATIVE FREE 10 ML: 5 INJECTION INTRAVENOUS at 09:33

## 2021-01-01 RX ADMIN — SODIUM CHLORIDE, PRESERVATIVE FREE 10 ML: 5 INJECTION INTRAVENOUS at 08:02

## 2021-01-01 RX ADMIN — CYCLOBENZAPRINE 10 MG: 10 TABLET, FILM COATED ORAL at 04:01

## 2021-01-01 RX ADMIN — PANTOPRAZOLE SODIUM 40 MG: 40 INJECTION, POWDER, FOR SOLUTION INTRAVENOUS at 06:32

## 2021-01-01 RX ADMIN — PANTOPRAZOLE SODIUM 40 MG: 40 INJECTION, POWDER, FOR SOLUTION INTRAVENOUS at 04:33

## 2021-01-01 RX ADMIN — PANTOPRAZOLE SODIUM 40 MG: 40 INJECTION, POWDER, FOR SOLUTION INTRAVENOUS at 08:14

## 2021-01-01 RX ADMIN — PANTOPRAZOLE SODIUM 40 MG: 40 INJECTION, POWDER, FOR SOLUTION INTRAVENOUS at 17:07

## 2021-01-01 RX ADMIN — PREDNISONE 60 MG: 20 TABLET ORAL at 09:14

## 2021-01-01 RX ADMIN — PANTOPRAZOLE SODIUM 40 MG: 40 INJECTION, POWDER, FOR SOLUTION INTRAVENOUS at 07:30

## 2021-01-01 RX ADMIN — PANTOPRAZOLE SODIUM 40 MG: 40 INJECTION, POWDER, FOR SOLUTION INTRAVENOUS at 08:29

## 2021-01-01 RX ADMIN — CEFTRIAXONE SODIUM 1 G: 1 INJECTION, SOLUTION INTRAVENOUS at 15:30

## 2021-10-22 ENCOUNTER — INPATIENT HOSPITAL (OUTPATIENT)
Dept: URBAN - METROPOLITAN AREA HOSPITAL 107 | Facility: HOSPITAL | Age: 56
End: 2021-10-22
Payer: COMMERCIAL

## 2021-10-22 DIAGNOSIS — K76.0 FATTY (CHANGE OF) LIVER, NOT ELSEWHERE CLASSIFIED: ICD-10-CM

## 2021-10-22 DIAGNOSIS — M62.82 RHABDOMYOLYSIS: ICD-10-CM

## 2021-10-22 DIAGNOSIS — R74.01 ELEVATION OF LEVELS OF LIVER TRANSAMINASE LEVELS: ICD-10-CM

## 2021-10-22 PROCEDURE — 99221 1ST HOSP IP/OBS SF/LOW 40: CPT | Performed by: NURSE PRACTITIONER

## 2021-10-25 ENCOUNTER — INPATIENT HOSPITAL (OUTPATIENT)
Dept: URBAN - METROPOLITAN AREA HOSPITAL 107 | Facility: HOSPITAL | Age: 56
End: 2021-10-25
Payer: COMMERCIAL

## 2021-10-25 DIAGNOSIS — K31.84 GASTROPARESIS: ICD-10-CM

## 2021-10-25 DIAGNOSIS — R13.10 DYSPHAGIA, UNSPECIFIED: ICD-10-CM

## 2021-10-25 DIAGNOSIS — R94.5 ABNORMAL RESULTS OF LIVER FUNCTION STUDIES: ICD-10-CM

## 2021-10-25 PROCEDURE — 99232 SBSQ HOSP IP/OBS MODERATE 35: CPT | Performed by: PHYSICIAN ASSISTANT

## 2021-11-11 PROBLEM — K62.5 RECTAL BLEEDING: Status: ACTIVE | Noted: 2021-01-01

## 2021-11-11 NOTE — CONSULTS
"Nutrition Services    Patient Name: Wilber Marcelino  YOB: 1965  MRN: [id number]  Admission date: 11/10/2021      CLINICAL NUTRITION ASSESSMENT      Reason for Assessment  Identified at risk by screening criteria, MST score 2+, Difficulty chewing/swallowing, Nonhealing wound or pressure ulcer   H&P:    Past Medical History:   Diagnosis Date   • Fatty liver    • Hypertension    • Kidney stone         Current Problems:   Active Hospital Problems    Diagnosis    • Rectal bleeding         Nutrition/Diet History         Narrative     RD consult for MST score of 4 along with difficulty swallowing, & Stage I pressure injury to gluteal.  Pt currently NPO.  No previous admissions to compare current wt.  Pt was residing at [facility] for rehab after s/p cholecystectomy in October.     Anthropometrics        Current Height, Weight Height: 157.5 cm (62\")  Weight: 64.5 kg (142 lb 3.2 oz)   Current BMI Body mass index is 26.01 kg/m².       Weight Hx  Wt Readings from Last 30 Encounters:   11/11/21 0410 64.5 kg (142 lb 3.2 oz)   11/11/21 0001 66.3 kg (146 lb 2.6 oz)              Estimated/Assessed Needs       Energy Requirements    EST Needs (kcal/day) 3838-0438       Protein Requirements    EST Daily Needs (g/day) 65       Fluid Requirements     Estimated Needs (mL/day) 1650     Labs/Medications         Pertinent Labs Reviewed.   Results from last 7 days   Lab Units 11/11/21 0743 11/11/21  0020   SODIUM mmol/L 141 140   POTASSIUM mmol/L 3.4* 3.7   CHLORIDE mmol/L 103 102   CO2 mmol/L 31.6* 27.9   BUN mg/dL 15 15   CREATININE mg/dL 0.26* 0.21*   CALCIUM mg/dL 7.7* 8.0*   BILIRUBIN mg/dL 0.5 0.3   ALK PHOS U/L 56 59   ALT (SGPT) U/L 133* 135*   AST (SGOT) U/L 91* 94*   GLUCOSE mg/dL 95 136*     Results from last 7 days   Lab Units 11/11/21  0743 11/11/21  0020 11/11/21  0020   MAGNESIUM mg/dL  --   --  2.0   PHOSPHORUS mg/dL  --   --  3.8   HEMOGLOBIN g/dL 10.8*   < > 14.5   HEMATOCRIT % 31.5*   < > 41.4    < > = " values in this interval not displayed.       Pertinent Medications Reviewed.     Current Nutrition Orders & Evaluation of Intake       Oral Nutrition     Current PO Diet NPO Diet   Supplement No active supplement orders       Nutrition Diagnosis         Nutrition Dx Problem 1 Inadequate oral Intake related to GI dysfunction as evidenced by NPO and per nursing documentation.     Nutrition Intervention         To be determined awaiting more details     Monitor/Evaluation        Monitor Pertinent labs, Weight, Skin status, Swallow function, Diet advancement     Nutrition Discharge Plan         To be determined     Electronically signed by:  Portia Olivia RD  11/11/21 10:37 EST

## 2021-11-11 NOTE — CONSULTS
[facility]  Initial Inpatient Consult Note    Referring Provider: Dr. Cole    Reason for Consultation: rectal bleeding    Subjective     History of present illness:    56 y.o. female with history of HTN and fatty liver who presented with c/o rectal bleeding.  Pt reports symptoms started 2 nights ago.  She reports mid abd pain, described as a dull ache, no radiation, no aggravating/alleviating factors.  She reports nausea.  No hematemesis.  She reports recent laparoscopic CCY 10/8.  Pt noted to be hypotensive in the ER with response to fluids.  Hgb 14.5 on presentation with WBC 18.4.  Hgb 12.4 today with persistent leukocytosis.  Other labs with AST 91, , alk phos 56, TB 0.5.    Past Medical History:  Past Medical History:   Diagnosis Date   • Fatty liver    • Hypertension    • Kidney stone      Past Surgical History:  Past Surgical History:   Procedure Laterality Date   • CHOLECYSTECTOMY     • FINGER SURGERY     • HERNIA REPAIR        Social History:   Social History     Tobacco Use   • Smoking status: Former Smoker   • Smokeless tobacco: Not on file   Substance Use Topics   • Alcohol use: Never      Family History:  History reviewed. No pertinent family history.    Home Meds:  Medications Prior to Admission   Medication Sig Dispense Refill Last Dose   • acetaminophen (TYLENOL) 325 MG tablet Take 650 mg by mouth Every 4 (Four) Hours As Needed for Mild Pain .      • albuterol (PROVENTIL) (5 MG/ML) 0.5% nebulizer solution Take 2.5 mg by nebulization Every 6 (Six) Hours As Needed for Wheezing.      • aspirin 325 MG tablet Take 325 mg by mouth Daily.      • bisacodyl (DULCOLAX) 10 MG suppository Insert 10 mg into the rectum Daily.      • cyclobenzaprine (FLEXERIL) 10 MG tablet Take 10 mg by mouth 3 (Three) Times a Day As Needed for Muscle Spasms.      • enoxaparin (LOVENOX) 40 MG/0.4ML solution syringe Inject  under the skin into the appropriate area as directed Daily.      •  Fluticasone Propionate, Inhal, 100 MCG/BLIST aerosol powder  Inhale.      • HYDROcodone-acetaminophen (NORCO) 5-325 MG per tablet Take 1 tablet by mouth Every 4 (Four) Hours As Needed.      • imipramine (TOFRANIL-PM) 100 MG capsule Take 100 mg by mouth Every Night.      • magnesium hydroxide (MILK OF MAGNESIA) 400 MG/5ML suspension Take  by mouth Daily As Needed for Constipation.      • mirtazapine (REMERON) 15 MG tablet Take 7.5 mg by mouth Every Night.      • omeprazole (priLOSEC) 40 MG capsule Take 40 mg by mouth Daily.      • polyethylene glycol (GlycoLax) 17 GM/SCOOP powder Take 17 g by mouth 2 (Two) Times a Day.      • predniSONE (DELTASONE) 20 MG tablet Take 20 mg by mouth Daily. Tapering doses ordered, started with 60mg on 11/4.  See MAR from [facility]   Patient Taking Differently at Unknown time   • Sodium Phosphates (fleet enema) 7-19 GM/118ML enema Insert  into the rectum 1 (One) Time.      • TUBERCULIN PPD ID Inject  into the appropriate area of the skin as directed by provider.      • witch hazel-glycerin (TUCKS) pad Insert  into the rectum As Needed for Irritation.      • ALPRAZolam (XANAX) 1 MG tablet Take 1 mg by mouth 2 (Two) Times a Day As Needed for Anxiety.        Current Meds:   pantoprazole, 40 mg, Intravenous, BID AC  potassium chloride, 10 mEq, Intravenous, Q1H  sodium chloride, 10 mL, Intravenous, Q12H      Allergies:  Allergies   Allergen Reactions   • Iodine Unknown - High Severity   • Penicillins Hives     Review of Systems  Pertinent items are noted in HPI, all other systems reviewed and negative     Objective     Vital Signs  Temp:  [97.6 °F (36.4 °C)-98.1 °F (36.7 °C)] 97.7 °F (36.5 °C)  Heart Rate:  [0-148] 105  Resp:  [18-26] 18  BP: ()/() 100/67  Physical Exam:  General Appearance:    Alert, cooperative, in no acute distress   Head:    Normocephalic, without obvious abnormality, atraumatic   Eyes:          conjunctivae and sclerae normal, no   icterus   Throat:    no thrush, oral mucosa moist   Neck:   Supple, no adenopathy   Lungs:     Breathing unlabored    Heart:    No chest tenderness   Chest Wall:    No abnormalities observed   Abdomen:     Soft, nondistended, mid abd TTP   Extremities:   no edema, no redness   Skin:   No bruising or rash   Psychiatric:  normal mood and insight     Results Review:   I reviewed the patient's new clinical results.    Results from last 7 days   Lab Units 11/11/21  1159 11/11/21  0743 11/11/21  0020   WBC 10*3/mm3  --  18.69* 18.41*   HEMOGLOBIN g/dL 12.4 10.8* 14.5   HEMATOCRIT % 36.5 31.5* 41.4   PLATELETS 10*3/mm3  --  308 372     Results from last 7 days   Lab Units 11/11/21  0743 11/11/21  0020   SODIUM mmol/L 141 140   POTASSIUM mmol/L 3.4* 3.7   CHLORIDE mmol/L 103 102   CO2 mmol/L 31.6* 27.9   BUN mg/dL 15 15   CREATININE mg/dL 0.26* 0.21*   CALCIUM mg/dL 7.7* 8.0*   BILIRUBIN mg/dL 0.5 0.3   ALK PHOS U/L 56 59   ALT (SGPT) U/L 133* 135*   AST (SGOT) U/L 91* 94*   GLUCOSE mg/dL 95 136*     Results from last 7 days   Lab Units 11/11/21  0058   INR  1.00*     Lab Results   Lab Value Date/Time    LIPASE 19 10/21/2021 1442       Radiology:  XR Chest 1 View   Final Result          Assessment/Plan   Patient Active Problem List   Diagnosis   • Rectal bleeding       Assessment:  1. Rectal bleeding  2. Acute blood loss anemia  3. Elevated liver enzymes    Plan:  · Given rectal bleeding, leukocytosis, and abd pain, recommend CT abd/pelvis.  Will order with oral contrast only given contrast allergy  · If CT negative, would recommend colonoscopy tomorrow for further evaluation of rectal bleeding  · Benefits versus risks of procedure d/w patient; risks include but are not limited to bleeding, infection, and perforation  · Pt understands risks and agrees to proceed if indicated      I discussed the patients findings and my recommendations with patient and primary care team.    Liliam Head MD

## 2021-11-11 NOTE — ED PROVIDER NOTES
Time: 12:32 AM EST  Arrived by: ambulance  Chief Complaint: Rectal bleeding    History of Present Illness:  Patient is a 56 y.o. year old female that presents to the emergency department with rectal bleeding    Patient's that [facility] center after severe weakness following cholecystectomy.  Patient's currently being treated with Lovenox and full dose aspirin for DVT prophylaxis.  Tonight patient developed rectal bleeding.  Patient states she had one large bloody bowel movement earlier tonight and one on arrival in the emergency department.  She is found to be mildly hypotensive.  She is awake alert and oriented and able to give complete history.      History provided by:  Patient  Rectal Bleeding  Quality:  Bright red  Amount:  Copious  Timing:  Sporadic  Chronicity:  New  Context: spontaneously    Similar prior episodes: no    Relieved by:  Nothing  Worsened by:  Nothing  Ineffective treatments:  None tried  Associated symptoms: dizziness    Associated symptoms: no abdominal pain, no fever and no vomiting    Risk factors: anticoagulant use        Similar Symptoms Previously: No  Recently seen: Yes at [place].      Patient Care Team  Primary Care Provider: Colt Hammond Sr., MD    Past Medical History:     Allergies   Allergen Reactions   • Iodine Unknown - High Severity   • Penicillins Hives     Past Medical History:   Diagnosis Date   • Fatty liver    • Hypertension    • Kidney stone      Past Surgical History:   Procedure Laterality Date   • CHOLECYSTECTOMY     • FINGER SURGERY     • HERNIA REPAIR       History reviewed. No pertinent family history.    Home Medications:  Prior to Admission medications    Not on File        Social History:   Social History     Tobacco Use   • Smoking status: Former Smoker   • Smokeless tobacco: Not on file   Vaping Use   • Vaping Use: Never used   Substance Use Topics   • Alcohol use: Never   • Drug use: Never       Record Review:  I have reviewed the patient's records in  "[facility].     Review of Systems:  Review of Systems   Constitutional: Negative for chills and fever.   HENT: Negative for congestion, ear pain and sore throat.    Eyes: Negative for pain.   Respiratory: Negative for cough, chest tightness and shortness of breath.    Cardiovascular: Negative for chest pain.   Gastrointestinal: Positive for hematochezia. Negative for abdominal pain, diarrhea, nausea and vomiting.   Genitourinary: Negative for flank pain and hematuria.   Musculoskeletal: Negative for joint swelling.   Skin: Negative for pallor.   Neurological: Positive for dizziness. Negative for seizures and headaches.   All other systems reviewed and are negative.       Physical Exam:  /67 (BP Location: Right arm, Patient Position: Lying)   Pulse 107   Temp 97.6 °F (36.4 °C) (Oral)   Resp 18   Ht 157.5 cm (62\")   Wt 64.5 kg (142 lb 3.2 oz)   SpO2 90%   BMI 26.01 kg/m²     Physical Exam  Vitals and nursing note reviewed.   Constitutional:       General: She is not in acute distress.     Appearance: Normal appearance. She is not toxic-appearing.   HENT:      Head: Normocephalic and atraumatic.      Jaw: There is normal jaw occlusion.   Eyes:      General: Lids are normal.      Extraocular Movements: Extraocular movements intact.      Conjunctiva/sclera: Conjunctivae normal.      Pupils: Pupils are equal, round, and reactive to light.   Cardiovascular:      Rate and Rhythm: Normal rate and regular rhythm.      Pulses: Normal pulses.      Heart sounds: Normal heart sounds.   Pulmonary:      Effort: Pulmonary effort is normal. No respiratory distress.      Breath sounds: Normal breath sounds. No wheezing or rhonchi.   Abdominal:      General: Abdomen is flat.      Palpations: Abdomen is soft.      Tenderness: There is no abdominal tenderness. There is no guarding or rebound.      Comments: Healing cholecystectomy laparoscopy sites.  Clean dry and intact.  Soft nontender.   Genitourinary:     Comments: Patient was " rolled with nursing staff and had large amount of dark red blood in her diaper.  Musculoskeletal:         General: Normal range of motion.      Cervical back: Normal range of motion and neck supple.      Right lower leg: No edema.      Left lower leg: No edema.   Skin:     General: Skin is warm and dry.      Coloration: Skin is pale.   Neurological:      Mental Status: She is alert and oriented to person, place, and time. Mental status is at baseline.   Psychiatric:         Mood and Affect: Mood normal.                Medications in the Emergency Department:  Medications   sodium chloride 0.9 % flush 10 mL (has no administration in time range)   nitroglycerin (NITROSTAT) SL tablet 0.4 mg (has no administration in time range)   sodium chloride 0.9 % flush 10 mL (10 mL Intravenous Given 11/11/21 0433)   sodium chloride 0.9 % flush 10 mL (has no administration in time range)   sodium chloride 0.9 % with KCl 20 mEq/L infusion (125 mL/hr Intravenous New Bag 11/11/21 0434)   acetaminophen (TYLENOL) tablet 650 mg (has no administration in time range)     Or   acetaminophen (TYLENOL) 160 MG/5ML solution 650 mg (has no administration in time range)     Or   acetaminophen (TYLENOL) suppository 650 mg (has no administration in time range)   albuterol (PROVENTIL) nebulizer solution 0.5% 2.5 mg/0.5mL (has no administration in time range)   ALPRAZolam (XANAX) tablet 1 mg (1 mg Oral Given 11/11/21 0635)   cyclobenzaprine (FLEXERIL) tablet 10 mg (has no administration in time range)   pantoprazole (PROTONIX) injection 40 mg (40 mg Intravenous Given 11/11/21 0433)   lactated ringers bolus 1,989 mL (0 mL/kg × 66.3 kg Intravenous Stopped 11/11/21 0153)   tranexamic acid 1000 mg in 100 ml NS Mini-bag plus (0 mg Intravenous Stopped 11/11/21 0153)        Labs  Lab Results (last 24 hours)     Procedure Component Value Units Date/Time    CBC & Differential [[id number]]  (Abnormal) Collected: 11/11/21 0020    Specimen: Blood Updated:  11/11/21 0028    Narrative:      The following orders were created for panel order CBC & Differential.  Procedure                               Abnormality         Status                     ---------                               -----------         ------                     CBC Auto Differential[[id number]]        Abnormal            Final result                 Please view results for these tests on the individual orders.    Comprehensive Metabolic Panel [[id number]]  (Abnormal) Collected: 11/11/21 0020    Specimen: Blood Updated: 11/11/21 0101     Glucose 136 mg/dL      BUN 15 mg/dL      Creatinine 0.21 mg/dL      Sodium 140 mmol/L      Potassium 3.7 mmol/L      Chloride 102 mmol/L      CO2 27.9 mmol/L      Calcium 8.0 mg/dL      Total Protein 4.8 g/dL      Albumin 2.90 g/dL      ALT (SGPT) 135 U/L      AST (SGOT) 94 U/L      Alkaline Phosphatase 59 U/L      Total Bilirubin 0.3 mg/dL      eGFR Non African Amer >150 mL/min/1.73      eGFR  African Amer >150 mL/min/1.73      Globulin 1.9 gm/dL      A/G Ratio 1.5 g/dL      BUN/Creatinine Ratio 71.4     Anion Gap 10.1 mmol/L     Narrative:      GFR Normal >60  Chronic Kidney Disease <60  Kidney Failure <15      Magnesium [[id number]]  (Normal) Collected: 11/11/21 0020    Specimen: Blood Updated: 11/11/21 0100     Magnesium 2.0 mg/dL     Phosphorus [[id number]]  (Normal) Collected: 11/11/21 0020    Specimen: Blood Updated: 11/11/21 0100     Phosphorus 3.8 mg/dL     Lactic Acid, Plasma [[id number]]  (Normal) Collected: 11/11/21 0020    Specimen: Blood Updated: 11/11/21 0057     Lactate 1.9 mmol/L     C-reactive Protein [[id number]]  (Normal) Collected: 11/11/21 0020    Specimen: Blood Updated: 11/11/21 0101     C-Reactive Protein 0.46 mg/dL     CBC Auto Differential [[id number]]  (Abnormal) Collected: 11/11/21 0020    Specimen: Blood Updated: 11/11/21 0028     WBC 18.41 10*3/mm3      RBC 5.33 10*6/mm3      Hemoglobin 14.5 g/dL      Hematocrit 41.4 %      MCV 77.7 fL      MCH  27.2 pg      MCHC 35.0 g/dL      RDW 16.9 %      RDW-SD 44.2 fl      MPV 9.1 fL      Platelets 372 10*3/mm3      Neutrophil % 74.4 %      Lymphocyte % 18.7 %      Monocyte % 5.8 %      Eosinophil % 0.0 %      Basophil % 0.3 %      Immature Grans % 0.8 %      Neutrophils, Absolute 13.70 10*3/mm3      Lymphocytes, Absolute 3.45 10*3/mm3      Monocytes, Absolute 1.06 10*3/mm3      Eosinophils, Absolute 0.00 10*3/mm3      Basophils, Absolute 0.05 10*3/mm3      Immature Grans, Absolute 0.15 10*3/mm3      nRBC 0.0 /100 WBC     Protime-INR [[id number]]  (Abnormal) Collected: 11/11/21 0058    Specimen: Blood Updated: 11/11/21 0134     Protime 10.5 Seconds      INR 1.00    Narrative:      Suggested Therapeutic Ranges For Oral Anticoagulant Therapy:  Level of Therapy                      INR Target Range  Standard Dose                            2.0-3.0  High Dose                                2.5-3.5  Patients not receiving anticoagulant  Therapy Normal Range                     0.6-1.2    Blood Culture - Blood, Hand, Left [[id number]] Collected: 11/11/21 0058    Specimen: Blood from Hand, Left Updated: 11/11/21 0106    aPTT [[id number]]  (Abnormal) Collected: 11/11/21 0213    Specimen: Blood Updated: 11/11/21 0255     PTT 18.5 seconds     Urinalysis With Microscopic If Indicated (No Culture) - Urine, Catheter [[id number]]  (Abnormal) Collected: 11/11/21 0628    Specimen: Urine, Catheter Updated: 11/11/21 0638     Color, UA Yellow     Appearance, UA Clear     pH, UA 6.5     Specific Gravity, UA 1.020     Glucose, UA Negative     Ketones, UA Negative     Bilirubin, UA Negative     Blood, UA Negative     Protein, UA Trace     Leuk Esterase, UA Negative     Nitrite, UA Negative     Urobilinogen, UA 1.0 E.U./dL    Narrative:      Urine microscopic not indicated.           Imaging:  XR Chest 1 View    Result Date: 11/11/2021  PROCEDURE: XR CHEST 1 VW  COMPARISON: None.  INDICATIONS: SEVERE SEPSIS TRIAGE PROTOCOL.  FINDINGS: A single  AP supine portable chest radiograph is provided for review.  There is pulmonary hypoinflation.  Probably no cardiac enlargement.  Hyperdensity is seen within the partially imaged bowel and may represent residual oral contrast agent, especially in the left colon.  The patient has undergone cholecystectomy.  There may be mild subsegmental atelectasis in the lung bases.  Probably no acute infiltrate.  No definite pneumothorax is seen.  A tiny left pleural effusion is possible.  CONCLUSION: Pulmonary hypoinflation is seen.  Probably no acute infiltrate.  A tiny left pleural effusion is possible.  Probably no cardiac enlargement.  Residual oral contrast agent is suggested as discussed.      OG STACK JR, MD       Electronically Signed and Approved By: OG STACK JR, MD on 11/11/2021 at 1:36               Procedures:  Procedures    Progress  ED Course as of 11/11/21 0644   Thu Nov 11, 2021   0041 EKG: Sinus rhythm 99, normal P wave, normal QRS, normal ST segment, normal QT, no comparison, low voltage.   [JS]      ED Course User Index  [JS] Claudy Pond MD                            Medical Decision Making:  MDM  Number of Diagnoses or Management Options  Rectal bleeding: new and requires workup  Diagnosis management comments: Total Critical Care time of 31  minutes. Total critical care time documented does not include time spent on separately billed procedures for services of nurses or physician assistants. I personally saw and examined the patient. I have reviewed all diagnostic interpretations and treatment plans as written. I was present for the key portions of any procedures performed and the inclusive time noted in any critical care statement. Critical care time includes patient management by me, time spent at the patients bedside, time to review lab and imaging results, discussing patient care, documentation in the medical record, and time spent with family or caregiver.       Amount and/or Complexity of  Data Reviewed  Clinical lab tests: ordered and reviewed  Tests in the medicine section of CPT®: ordered and reviewed  Discuss the patient with other providers: yes       Patient had continued bleeding at the time of admission to the emergency department but no further bleeding during her ED stay.  Her blood pressure improved with IV fluid resuscitation.  Hemoglobin hematocrit are within normal limits.  Will hold patient's Lovenox and aspirin at this time.  Patient has no abdominal tenderness no indication for CT imaging.  The patient's airway is intact, vital signs, and respiratory status are safe for admission at this time.          Final diagnoses:   Rectal bleeding        Disposition:  ED Disposition     ED Disposition Condition Comment    Decision to Admit  Level of Care: Telemetry [5]   Diagnosis: Rectal bleeding [[id number]]              This medical record created using voice recognition software and may contain unintended errors.    Documentation assistance provided by Claudy Pond MD acting as scribe for Claudy Pond MD. Information recorded by the scribe was done at my direction and has been verified and validated by me.        Claudy Pond MD  11/11/21 0635

## 2021-11-11 NOTE — SIGNIFICANT NOTE
11/11/21 1100   Plan   Plan Patient was transferred from [facility] when she had been at rehab for 7 days.  Patient plans to return at discharge.  Patient reports would like to return home after rehab.  Reports has good support system at home.

## 2021-11-11 NOTE — H&P
Patient Care Team:  Colt Hammond Sr., MD as PCP - General (Internal Medicine)    Chief complaint   Bloody stool    Subjective     History of Present Illness  Note: Patient is alert and oriented but is so tangential that is difficult to get exact clear picture of her history.  56-year-old woman status post cholecystectomy in early October who presents from rehab facility after an episode of bloody stool.  It is not exactly clear why she was at the rehab facility.  Additionally there is an unclear report of DVT/PE which puts twice daily dosing of Lovenox on her med list; patient denies any clotting events.    In ED:  Patient was noted to have hypotension which did respond to fluid boluses.  Patient had borderline low sats to 93% and nasal cannula was placed and the patient reported feeling better.    Labs remarkable for:  WBC 18.4  Hgb 14.5  Creatinine 0.21    CXR: Within normal limits    EKG: Sinus tach    Review of Systems   Reports bloody stool, weakness, episodes of nausea.  Denies fevers, chills, chest pain, shortness of breath, abdominal pain, dizziness, lightheadedness, confusion, headache.    Past History:  Medical History: has a past medical history of Fatty liver, Hypertension, and Kidney stone.   Surgical History: has a past surgical history that includes Finger surgery; Hernia repair; and Cholecystectomy.   Family History: family history is not on file.   Social History: reports that she has quit smoking. She does not have any smokeless tobacco history on file. She reports that she does not drink alcohol and does not use drugs.    (Not in a hospital admission)     Allergies: Iodine and Penicillins    Objective      Vital Signs  Temp:  [97.7 °F (36.5 °C)] 97.7 °F (36.5 °C)  Heart Rate:  [] 106  Resp:  [23-26] 26  BP: ()/(51-92) 96/75    Physical Exam  Constitutional:       General: She is not in acute distress.     Appearance: Normal appearance. She is not toxic-appearing.   HENT:       Head: Normocephalic and atraumatic.   Eyes:      General: Lids are normal.      Extraocular Movements: Extraocular movements intact.      Conjunctiva/sclera: Conjunctivae normal.      Pupils: Pupils are equal, round, and reactive to light.   Cardiovascular:      Rate and Rhythm: Normal rate and regular rhythm.      Pulses: Normal pulses.      Heart sounds: Normal heart sounds.   Pulmonary:      Effort: Pulmonary effort is normal. No respiratory distress.      Breath sounds: Normal breath sounds. No wheezing or rhonchi.   Abdominal:      General: Abdomen is flat.      Palpations: Abdomen is soft.      Tenderness: There is no abdominal tenderness. There is no guarding or rebound.      Comments: Healing cholecystectomy laparoscopy sites.  Clean dry and intact.  Soft nontender.   Genitourinary:     Comments: Per nursing, had large amount of dark red blood in her diaper.  Musculoskeletal:         General: Normal range of motion.      Cervical back: Normal range of motion and neck supple.      Right lower le+ pitting edema     Left lower le+ pitting edema  Skin:     General: Skin is warm and dry.      Coloration: Skin is pale.   Neurological:      Mental Status: She is alert and oriented to person, place, and time. Mental status is at baseline.   Psychiatric:         Mood and Affect: Totally pleasant.  But tangential and perseverates on things.  Unclear if there is some underlying psychiatric issue.     Results Review:   I reviewed the patient's new clinical results.      Assessment/Plan       Rectal bleeding      Assessment & Plan  56-year-old woman status post cholecystectomy in early October who presents from rehab facility after an episode of bloody stool.     1.)  Bloody stools: Does not seem to be symptomatic; is not actually anemic  -With hypotension   - Responded to fluid boluses   [ ] Monitor vitals  -Monitor for ongoing bleeding  -Stop treatment dose Lovenox and aspirin  -Started IV Protonix  [ ]  Consider GI consult in the a.m.  -Patient is n.p.o. in case procedure is needed  -On telemetry as actively bleeding    2.)  Unclear history of clotting:  -HOLDING Lovenox in the setting of GI bleed  [ ] Follow-up with other facilities for clear picture of history and med list    3.)  Depression:  -Med list reads both Remeron and imipramine and patient is unclear what her meds are: HOLDING BOTH  [ ] Follow-up with other facilities for clear picture of history and med list    4.)  Unclear history of asthma  -Home albuterol  -Med list reads prednisone 20 but patient unclear if she takes it   [ ] Clarify meds in a.m.  -Not strictly respiratory failure but feels better with nasal cannula    DVT prophylaxis: Held in the setting of GI bleed  Diet: N.p.o. until determined if GI will do something  CODE STATUS: Full  Dispo: Pending resolution of bleed or assessment by GI; also needs a clear picture of her medical history and why she is in rehab    I discussed the patients findings and my recommendations with patient    Bryon Murillo MD  11/11/21  02:37 EST    Time: > 35 minutes

## 2021-11-12 NOTE — PLAN OF CARE
Goal Outcome Evaluation:  Plan of Care Reviewed With: patient        Progress: no change       VSS, 2L NC. Pt very weak. Q2 turn. Wound consulted and evaluated for shearing on bottom. No s/s of bleeding, hgb decreased from 12 last night to 9 today. Plan for colonoscopy Monday. Diet advanced to low residue. C/o lower back pain, flexeril given. Will CTM and provide care.

## 2021-11-12 NOTE — PROGRESS NOTES
[facility]   Hospitalist Progress Note  Date: 2021  Patient Name: Wilber Marcelino  : 1965  MRN: [id number]  Date of admission: 11/10/2021      Subjective   Subjective     Chief Complaint: Blood in stool    Summary: 56-year-old female past medical history significant for fatty liver disease, hypertension, recent admission for rhabdomyolysis secondary to myositis at [facility] muscle biopsy results pending presents from [facility] and rehab after developing blood in her stools.  Of note patient was on a steroid taper as well as lactic Lovenox and aspirin.  Hemoglobin found to be decreased from 16 on previous discharge to 10.  Patient was admitted for further evaluation and treatment of acute blood loss anemia thought secondary to GI bleed.  Gastroenterology consulted.    Interval Followup: Patient lying in bed appears to be resting comfortably.  Patient denies abdominal pain.  Patient is had 2 nonbloody bowel movements overnight.  Patient denies any new issues.  Heart rate 80s to 100s on telemetry.  Blood pressure borderline low.  Satting well on room air.  CK elevated this morning.  Bicarb elevated.  LFTs elevated.  White blood cell count trending down.  Hemoglobin trending down.  Blood cultures negative to date.  CT the abdomen negative for any acute intra-abdominal processes, demonstrated atelectasis diffuse heterogeneity of L4 vertebral body which had previously been worked up by neurosurgery and neurology at [facility] [place].    Review of Systems   Constitutional: Positive for fatigue. Negative for fever.   HENT: Negative for sore throat and trouble swallowing.    Eyes: Negative for pain and discharge.   Respiratory: Negative for cough and shortness of breath.    Cardiovascular: Negative for chest pain and palpitations.   Gastrointestinal: Negative for abdominal pain, nausea and vomiting.   Endocrine: Negative for cold intolerance and heat intolerance.   Genitourinary:  Negative for difficulty urinating and dysuria.   Musculoskeletal: Positive for myalgias. Negative for back pain and neck stiffness.   Skin: Negative for color change and rash.   Neurological: Positive for weakness. Negative for syncope and headaches.   Hematological: Negative for adenopathy.   Psychiatric/Behavioral: Negative for confusion and hallucinations.       Objective   Objective     Vitals:   Temp:  [97.1 °F (36.2 °C)-98.78 °F (37.1 °C)] 98.78 °F (37.1 °C)  Heart Rate:  [] 99  Resp:  [16-18] 16  BP: ()/(40-71) 99/57  Flow (L/min):  [2] 2  Physical Exam    Constitutional: Awake, alert, no acute distress   Eyes: Pupils equal, sclerae anicteric, no conjunctival injection   HENT: NCAT, mucous membranes moist   Neck: Supple, no thyromegaly, no lymphadenopathy, trachea midline   Respiratory: Clear to auscultation bilaterally, nonlabored respirations    Cardiovascular: RRR, no murmurs, rubs, or gallops, palpable pedal pulses bilaterally   Gastrointestinal: Positive bowel sounds, soft, nontender, nondistended   Musculoskeletal: No bilateral ankle edema, no clubbing or cyanosis to extremities   Psychiatric: Appropriate affect, cooperative thought content tangential   Neurologic: Oriented x 3, 2 out of 5 strength symmetric in all extremities, Cranial Nerves grossly intact to confrontation, speech clear   Skin: No rashes     Result Review    Result Review:  I have personally reviewed the results from the time of this admission to 11/12/2021 18:28 EST and agree with these findings:  [x]  Laboratory  [x]  Microbiology  [x]  Radiology  []  EKG/Telemetry   []  Cardiology/Vascular   []  Pathology  [x]  Old records discharge summary from recent hospitalization at [facility]  []  Other:    Assessment/Plan   Assessment / Plan     Assessment/Plan:  Acute blood loss anemia secondary to GI bleed  GI bleed likely lower GI source  Myositis  Rhabdomyolysis  Fatty liver disease  Leukocytosis likely secondary to  steroids  Likely benign hemangioma of L4 vertebral body previously worked up  Debility with decreased ADLs    Patient admitted for further evaluation treatment  Gastroenterology consulted thank you for assistance  Continue to monitor hemoglobin closely  Transfuse to keep hemoglobin greater than 8  Continue Protonix daily  Hold home aspirin  Hold Lovenox  Plan for prep on Sunday with colonoscopy on Monday  Restart prednisone taper and monitor closely  Start IV fluids  Trend CK    PT/OT consulted  Likely return to SNF once stable       Discussed plan with RN and gastroenterology attending.    DVT prophylaxis:  Medical DVT prophylaxis orders are present.    CODE STATUS:   Code Status (Patient has no pulse and is not breathing): CPR (Attempt to Resuscitate)  Medical Interventions (Patient has pulse or is breathing): Full Support        Electronically signed by Tyson Cole MD, 11/12/21, 6:28 PM EST.

## 2021-11-12 NOTE — PLAN OF CARE
Goal Outcome Evaluation:  Plan of Care Reviewed With: patient        Progress: no change (First session for evaluation)  Outcome Summary: Patient presents with deficits of endurance/activity tolerance, upper extremity strength and likely balance which impacting ADL/transfer independence.  Skilled OT services needed to remediate/compensate for deficits to maximize independence.

## 2021-11-12 NOTE — PLAN OF CARE
Goal Outcome Evaluation:  Plan of Care Reviewed With: patient PATIENT HAD CT OF ABD AND PELVIS LAST PM . DECREASE IN HGB NOTED  NO BLOODY STOOLS OR RECTAL BLEEDING  NOTED THIS SHIFT,  VITALS STABLE . NO C/O OF NAUSEA . DR. RUSSELL TO SEE PT ABOUT  POSSIBLE COLONOSCOPY TODAY . CONTINUE PLAN OF CARE   Vitals:    11/12/21 0737   BP: 92/40   Pulse: 99   Resp: 16   Temp: 97.7 °F (36.5 °C)   SpO2: 98%

## 2021-11-12 NOTE — CONSULTS
Nutrition Services    Patient Name:  Wilber Marcelino  YOB: 1965  MRN: [id number]  Admit Date:  11/10/2021    RD follow-up:  Pt diet advanced to low residue/low fiber for the day.  Possible colonoscopy tomorrow.  Pt stated UBW back in April was 173#, current wt 142#, a 31# wt loss, a 17.9% change over 7 months.  Pt requesting popsicles on trays.  Since pt wanting more like clears until colonoscopy will add supplement of Boost Breeze TID to trays & monitor intake.    Electronically signed by:  Portia Olivia RD  11/12/21 12:01 EST

## 2021-11-12 NOTE — THERAPY EVALUATION
Acute Care - Physical Therapy Initial Evaluation   [place]     Patient Name: Wilber Marcelino  : 1965  MRN: [id number]  Today's Date: 2021      Visit Dx:     ICD-10-CM ICD-9-CM   1. Rectal bleeding  K62.5 569.3   2. Difficulty walking  R26.2 719.7   3. Decreased activities of daily living (ADL)  Z78.9 V49.89     Patient Active Problem List   Diagnosis   • Rectal bleeding     Past Medical History:   Diagnosis Date   • Fatty liver    • Hypertension    • Kidney stone      Past Surgical History:   Procedure Laterality Date   • CHOLECYSTECTOMY     • FINGER SURGERY     • HERNIA REPAIR       PT Assessment (last 12 hours)     PT Evaluation and Treatment     Row Name 21 1342          Physical Therapy Time and Intention    Document Type evaluation  -AV     Mode of Treatment individual therapy; physical therapy  -AV     Row Name 21 1344          General Information    Patient Profile Reviewed yes  -AV     Prior Level of Function independent:; all household mobility; gait; transfer; ADL's  Ambulated without an AD. 2 ROBERT. No home O2. Patient at subacute rehab prior to this hospitalization. Pt reported assist with ADLs and transferring with use of mason while at rehab.  -AV     Existing Precautions/Restrictions fall  -AV     Row Name 21 1347          Living Environment    Current Living Arrangements home/apartment/condo  -AV     Home Accessibility stairs to enter home  -AV     Lives With alone  Sister has been staying with patient recently.  -AV     Row Name 21 1344          Home Main Entrance    Number of Stairs, Main Entrance two  -AV     Row Name 21 134          Range of Motion (ROM)    Range of Motion --  PROM WFL. Pt unable to perform AROM.  -AV     Row Name 21 1349          Strength (Manual Muscle Testing)    Strength (Manual Muscle Testing) --  B ankle 3/5. B Knee ext 2+/5. B Hip flex 1+/5  -AV     Row Name 21 134          Bed Mobility    Bed Mobility rolling right  -AV      Rolling Right [place] (Bed Mobility) dependent (less than 25% patient effort)  -AV     Bed Mobility, Safety Issues decreased use of arms for pushing/pulling; decreased use of legs for bridging/pushing  -AV     Assistive Device (Bed Mobility) draw sheet  -AV     Row Name 11/12/21 1345          Transfers    Transfers --  Pt declined all transfers.  -AV     Row Name 11/12/21 1345          Safety Issues, Functional Mobility    Impairments Affecting Function (Mobility) balance; cognition; endurance/activity tolerance; strength  -AV     Cognitive Impairments, Mobility Safety/Performance insight into deficits/self-awareness; judgment  -AV     Row Name 11/12/21 1359          Balance    Balance Assessment --  Unable to assess  -AV     Row Name             Wound 11/11/21 Right gluteal Pressure Injury    Wound - Properties Group Placement Date: 11/11/21  -ML Present on Hospital Admission: Y  -ML Side: Right  -ML Location: gluteal  -ML Primary Wound Type: Pressure inj  -ML Stage, Pressure Injury : Stage 1  -ML     Retired Wound - Properties Group Date first assessed: 11/11/21  -ML Present on Hospital Admission: Y  -ML Side: Right  -ML Location: gluteal  -ML Primary Wound Type: Pressure inj  -ML     Row Name             Wound 11/12/21 1157 Left gluteal    Wound - Properties Group Placement Date: 11/12/21  -AR Placement Time: 1157  -AR Present on Hospital Admission: N  -AR Side: Left  -AR Location: gluteal  -AR     Retired Wound - Properties Group Date first assessed: 11/12/21  -AR Time first assessed: 1157  -AR Present on Hospital Admission: N  -AR Side: Left  -AR Location: gluteal  -AR     Row Name 11/12/21 1359          Plan of Care Review    Plan of Care Reviewed With patient  -AV     Progress no change  -AV     Outcome Summary Patient presents with deficits impacting strength, activity tolerance and likely balance, transfers, and ambulation. Patient will benefit from skilled PT services to address these mobility  deficits and decrease risk of falls.  -AV     Row Name 11/12/21 8412          Physical Therapy Goals    Bed Mobility Goal Selection (PT) bed mobility, PT goal 1  -AV     Transfer Goal Selection (PT) transfer, PT goal 1  -AV     Gait Training Goal Selection (PT) gait training, PT goal 1  -AV     Row Name 11/12/21 0282          Bed Mobility Goal 1 (PT)    Activity/Assistive Device (Bed Mobility Goal 1, PT) bed mobility activities, all  -AV     [place] Level/Cues Needed (Bed Mobility Goal 1, PT) minimum assist (75% or more patient effort)  -AV     Time Frame (Bed Mobility Goal 1, PT) long term goal (LTG); 10 days  -AV     Row Name 11/12/21 0927          Transfer Goal 1 (PT)    Activity/Assistive Device (Transfer Goal 1, PT) sit-to-stand/stand-to-sit; bed-to-chair/chair-to-bed; walker, rolling  -AV     [place] Level/Cues Needed (Transfer Goal 1, PT) minimum assist (75% or more patient effort)  -AV     Time Frame (Transfer Goal 1, PT) long term goal (LTG); 10 days  -AV     Row Name 11/12/21 8264          Gait Training Goal 1 (PT)    Activity/Assistive Device (Gait Training Goal 1, PT) gait (walking locomotion); assistive device use; walker, rolling  -AV     [place] Level (Gait Training Goal 1, PT) minimum assist (75% or more patient effort)  -AV     Distance (Gait Training Goal 1, PT) 10  -AV     Time Frame (Gait Training Goal 1, PT) long term goal (LTG); 10 days  -AV     Row Name 11/12/21 9558          Therapy Assessment/Plan (PT)    Rehab Potential (PT) fair, will monitor progress closely  -AV     Criteria for Skilled Interventions Met (PT) yes; meets criteria  -AV     Problem List (PT) problems related to; balance; mobility; strength  -AV     Row Name 11/12/21 3624          PT Evaluation Complexity    History, PT Evaluation Complexity 1-2 personal factors and/or comorbidities  -AV     Examination of Body Systems (PT Eval Complexity) total of 4 or more elements  -AV     Clinical Presentation (PT  Evaluation Complexity) stable  -AV     Clinical Decision Making (PT Evaluation Complexity) low complexity  -AV     Overall Complexity (PT Evaluation Complexity) low complexity  -AV     Row Name 11/12/21 1359          Therapy Plan Review/Discharge Plan (PT)    Therapy Plan Review (PT) evaluation/treatment results reviewed; patient  -AV           User Key  (r) = Recorded By, (t) = Taken By, (c) = Cosigned By    Initials Name Provider Type    AR Jessie Aranda, RN Registered Nurse    Pearl Pleitez RN Registered Nurse    Cheko Toledo, PT Physical Therapist              Physical Therapy Education                 Title: PT OT SLP Therapies (In Progress)     Topic: Physical Therapy (Done)     Point: Mobility training (Done)     Learning Progress Summary           Patient Acceptance, E,TB, VU by AV at 11/12/2021 1406                   Point: Home exercise program (Done)     Learning Progress Summary           Patient Acceptance, E,TB, VU by AV at 11/12/2021 1406                   Point: Body mechanics (Done)     Learning Progress Summary           Patient Acceptance, E,TB, VU by AV at 11/12/2021 1406                   Point: Precautions (Done)     Learning Progress Summary           Patient Acceptance, E,TB, VU by AV at 11/12/2021 1406                               User Key     Initials Effective Dates Name Provider Type Discipline    AV 06/11/21 -  Cheko Sauceda, LORENZO Physical Therapist PT              PT Recommendation and Plan  Anticipated Discharge Disposition (PT): sub acute care setting  Planned Therapy Interventions (PT): balance training, bed mobility training, gait training, neuromuscular re-education, strengthening, transfer training  Therapy Frequency (PT): daily  Plan of Care Reviewed With: patient  Progress: no change  Outcome Summary: Patient presents with deficits impacting strength, activity tolerance and likely balance, transfers, and ambulation. Patient will benefit from skilled PT  services to address these mobility deficits and decrease risk of falls.   Outcome Measures     Row Name 11/12/21 1400             How much help from another person do you currently need...    Turning from your back to your side while in flat bed without using bedrails? 2  -AV      Moving from lying on back to sitting on the side of a flat bed without bedrails? 2  -AV      Moving to and from a bed to a chair (including a wheelchair)? 1  -AV      Standing up from a chair using your arms (e.g., wheelchair, bedside chair)? 1  -AV      Climbing 3-5 steps with a railing? 1  -AV      To walk in hospital room? 1  -AV      AM-PAC 6 Clicks Score (PT) 8  -AV              Functional Assessment    Outcome Measure Options AM-PAC 6 Clicks Basic Mobility (PT)  -AV            User Key  (r) = Recorded By, (t) = Taken By, (c) = Cosigned By    Initials Name Provider Type    AV Cheko Sauceda, PT Physical Therapist                 Time Calculation:    PT Charges     Row Name 11/12/21 1404             Time Calculation    PT Received On 11/12/21  -AV      PT Goal Re-Cert Due Date 11/21/21  -AV              Untimed Charges    PT Eval/Re-eval Minutes 35  -AV              Total Minutes    Untimed Charges Total Minutes 35  -AV       Total Minutes 35  -AV            User Key  (r) = Recorded By, (t) = Taken By, (c) = Cosigned By    Initials Name Provider Type    Cheko Toledo, PT Physical Therapist              Therapy Charges for Today     Code Description Service Date Service Provider Modifiers Qty    96285837710 HC PT EVAL LOW COMPLEXITY 3 11/12/2021 Cheko Sauceda, PT GP 1          PT G-Codes  Outcome Measure Options: AM-PAC 6 Clicks Basic Mobility (PT)  AM-PAC 6 Clicks Score (PT): 8  AM-PAC 6 Clicks Score (OT): 14    Cheko Sauceda PT  11/12/2021

## 2021-11-12 NOTE — PROGRESS NOTES
[facility]  Inpatient Progress Note    Reason for Follow Up:  Rectal bleeding    Subjective     Interval History:   Still with mild abd pain.  Bowel movement with no blood over night.  No nausea, vomiting.    Current Facility-Administered Medications:   •  acetaminophen (TYLENOL) tablet 650 mg, 650 mg, Oral, Q4H PRN, 650 mg at 11/12/21 0021 **OR** acetaminophen (TYLENOL) 160 MG/5ML solution 650 mg, 650 mg, Oral, Q4H PRN **OR** acetaminophen (TYLENOL) suppository 650 mg, 650 mg, Rectal, Q4H PRN, Bryon Murillo MD  •  albuterol (PROVENTIL) nebulizer solution 0.5% 2.5 mg/0.5mL, 2.5 mg, Nebulization, Q6H PRN, Bryon Murillo MD  •  ALPRAZolam (XANAX) tablet 1 mg, 1 mg, Oral, BID PRN, Bryon Murillo MD, 1 mg at 11/12/21 0022  •  cyclobenzaprine (FLEXERIL) tablet 10 mg, 10 mg, Oral, TID PRN, Bryon Murillo MD  •  nitroglycerin (NITROSTAT) SL tablet 0.4 mg, 0.4 mg, Sublingual, Q5 Min PRN, Bryon Murillo MD  •  pantoprazole (PROTONIX) injection 40 mg, 40 mg, Intravenous, BID AC, Bryon Murillo MD, 40 mg at 11/11/21 1707  •  sodium chloride 0.9 % flush 10 mL, 10 mL, Intravenous, PRN, Claudy Pond MD  •  sodium chloride 0.9 % flush 10 mL, 10 mL, Intravenous, Q12H, Bryon Murillo MD, 10 mL at 11/11/21 2112  •  sodium chloride 0.9 % flush 10 mL, 10 mL, Intravenous, PRN, Bryon Murillo MD  Review of Systems:    The following systems were reviewed and negative;  constitution, respiratory and cardiovascular    Objective     Vital Signs  Temp:  [97.1 °F (36.2 °C)-97.7 °F (36.5 °C)] 97.7 °F (36.5 °C)  Heart Rate:  [] 99  Resp:  [16-18] 16  BP: ()/(40-76) 92/40  Body mass index is 26.01 kg/m².    Intake/Output Summary (Last 24 hours) at 11/12/2021 0755  Last data filed at 11/12/2021 0515  Gross per 24 hour   Intake --   Output 550 ml   Net -550 ml     No intake/output data recorded.     Physical Exam:   General: awake, alert  and in no acute distress   Eyes: eyes move symmetrical in all directions, no scleral icterus   Neck: supple, trachea is midline   Skin: warm and dry, not jaundiced   Cardiovascular: no chest tenderness   Pulm: breathing unlabored   Abdomen: soft, nontender, mid abd TTP   Rectal: deferred   Extremities: no rash or edema   Psychiatric: mental status within normal limits, alert and oriented      Results Review:     I reviewed the patient's new clinical results.    Results from last 7 days   Lab Units 11/12/21  0428 11/11/21  1926 11/11/21  1159 11/11/21  0743 11/11/21  0743 11/11/21  0020 11/11/21  0020   WBC 10*3/mm3 11.40*  --   --   --  18.69*  --  18.41*   HEMOGLOBIN g/dL 9.1* 12.6 12.4   < > 10.8*   < > 14.5   HEMATOCRIT % 26.7* 36.8 36.5   < > 31.5*   < > 41.4   PLATELETS 10*3/mm3 261  --   --   --  308  --  372    < > = values in this interval not displayed.     Results from last 7 days   Lab Units 11/12/21 0428 11/11/21  0743 11/11/21  0020   SODIUM mmol/L 138 141 140   POTASSIUM mmol/L 4.1 3.4* 3.7   CHLORIDE mmol/L 103 103 102   CO2 mmol/L 29.3* 31.6* 27.9   BUN mg/dL 12 15 15   CREATININE mg/dL 0.22* 0.26* 0.21*   CALCIUM mg/dL 7.5* 7.7* 8.0*   BILIRUBIN mg/dL 0.4 0.5 0.3   ALK PHOS U/L 54 56 59   ALT (SGPT) U/L 122* 133* 135*   AST (SGOT) U/L 100* 91* 94*   GLUCOSE mg/dL 74 95 136*     Results from last 7 days   Lab Units 11/11/21  0058   INR  1.00*     Lab Results   Lab Value Date/Time    LIPASE 19 10/21/2021 1442       Radiology:  [unfilled]      Assessment/Plan   Assessment:     1. Rectal bleeding  2. Acute blood loss anemia  3. Elevated liver enzymes; h/o COOPER (followed by Dr. Lind)      Plan:     - CT abd/pelvis without any acute abnormalities of the GI tract; no diverticulitis  - will plan for colonoscopy on Monday to further evaluate rectal bleeding    I discussed the patients findings and my recommendations with patient, nursing staff and primary care team.         Liliam Head,  M.D.  [place] [id number]  [place]  [id number]  Office: [phone]

## 2021-11-12 NOTE — THERAPY EVALUATION
Patient Name: Wilber Marcelino  : 1965    MRN: [id number]                              Today's Date: 2021       Admit Date: 11/10/2021    Visit Dx:     ICD-10-CM ICD-9-CM   1. Rectal bleeding  K62.5 569.3   2. Difficulty walking  R26.2 719.7   3. Decreased activities of daily living (ADL)  Z78.9 V49.89     Patient Active Problem List   Diagnosis   • Rectal bleeding     Past Medical History:   Diagnosis Date   • Fatty liver    • Hypertension    • Kidney stone      Past Surgical History:   Procedure Laterality Date   • CHOLECYSTECTOMY     • FINGER SURGERY     • HERNIA REPAIR        General Information     Row Name 21 134          OT Time and Intention    Document Type evaluation  -AV     Mode of Treatment individual therapy; occupational therapy  -AV     Row Name 21 134          General Information    Patient Profile Reviewed yes  -AV     Prior Level of Function independent:; home management; all household mobility  At home, patient independent.  Sits to bathe/tub without DME.  Stands at sink to groom.  Elevated commode.  Ambulates without assistive device.  No home oxygen.  She has been at subacute rehab prior to this hospitalization: specific skill levels unknown.  -AV     Existing Precautions/Restrictions fall  -AV     Barriers to Rehab none identified  -AV     Row Name 21          Occupational Profile    Reason for Services/Referral (Occupational Profile) Patient is a 56-year-old female admitted November 10, 2021 with rectal bleeding.  She is currently on fifth floor/room air.  OT consulted due to recent decline in ADL/transfer independence.  No previous OT services for current condition.  -AV     Row Name 21          Living Environment    Lives With alone  Patient reports that she lives alone, her sister has been staying with her recently.  -AV     Row Name 21 134          Home Main Entrance    Number of Stairs, Main Entrance two  -AV     Row Name 21  "         Cognition    Orientation Status (Cognition) --  Alert, able to retain information and follow commands.  Impaired attention to task with scattered thoughts.  Moderate cues for redirection.  -AV     Row Name 11/12/21 1342          Safety Issues, Functional Mobility    Impairments Affecting Function (Mobility) balance; cognition; endurance/activity tolerance; strength  -AV           User Key  (r) = Recorded By, (t) = Taken By, (c) = Cosigned By    Initials Name Provider Type    Mike Martinez, FRANCIE Occupational Therapist                 Mobility/ADL's     [facility] Name 11/12/21 1344          Transfers    Transfers --  Patient unable to transfer supine to long sitting with max assist at this time  -AV     [facility] Name 11/12/21 1344          Activities of Daily Living    BADL Assessment/Intervention --  Patient is able to feed self independently with set up and specific positioning of items.  \"I can do it myself\".  Max assist further ADLs.  -AV           User Key  (r) = Recorded By, (t) = Taken By, (c) = Cosigned By    Initials Name Provider Type    Mike Martinez OT Occupational Therapist               Obj/Interventions     [facility] Name 11/12/21 1346          Sensory Assessment (Somatosensory)    Sensory Assessment (Somatosensory) UE sensation intact  -AV     [facility] Name 11/12/21 134          Vision Assessment/Intervention    Visual Impairment/Limitations WFL  Glasses to drive at night  -AV     Row Name 11/12/21 1344          Range of Motion Comprehensive    General Range of Motion upper extremity range of motion deficits identified  Less than 45 degrees active bilateral shoulder flexion.  Elbow, wrist, hand/full opposition AROM within functional limits.  [facility] Name 11/12/21 1349          Strength Comprehensive (MMT)    Comment, General Manual Muscle Testing (MMT) Assessment 2 -/5 anterior deltoids.  Biceps, triceps and  are equal at 4 -/5.  -Zoomph     [facility] Name 11/12/21 1347          Motor Skills    Motor Skills " coordination; functional endurance  -AV     Coordination --  Right dominant-functional per patient report  -AV     Functional Endurance Poor  -AV     Row Name 11/12/21 1345          Balance    Balance Assessment --  Unable to assess  -AV           User Key  (r) = Recorded By, (t) = Taken By, (c) = Cosigned By    Initials Name Provider Type    AV Mike Bolden OT Occupational Therapist               Goals/Plan     Row Name 11/12/21 1347          Transfer Goal 1 (OT)    Activity/Assistive Device (Transfer Goal 1, OT) transfers, all; walker, rolling  -AV     [place] Level/Cues Needed (Transfer Goal 1, OT) modified independence  -AV     Time Frame (Transfer Goal 1, OT) long term goal (LTG); 10 days  -AV     Row Name 11/12/21 1347          Bathing Goal 1 (OT)    Activity/Device (Bathing Goal 1, OT) bathing skills, all; tub bench  -AV     [place] Level/Cues Needed (Bathing Goal 1, OT) modified independence  -AV     Time Frame (Bathing Goal 1, OT) long term goal (LTG); 10 days  -AV     Row Name 11/12/21 1347          Dressing Goal 1 (OT)    Activity/Device (Dressing Goal 1, OT) dressing skills, all  -AV     [place]/Cues Needed (Dressing Goal 1, OT) modified independence  -AV     Time Frame (Dressing Goal 1, OT) long term goal (LTG); 10 days  -AV     Row Name 11/12/21 1347          Toileting Goal 1 (OT)    Activity/Device (Toileting Goal 1, OT) toileting skills, all; raised toilet seat  -AV     [place] Level/Cues Needed (Toileting Goal 1, OT) modified independence  -AV     Time Frame (Toileting Goal 1, OT) long term goal (LTG); 10 days  -AV     Row Name 11/12/21 1347          Grooming Goal 1 (OT)    Activity/Device (Grooming Goal 1, OT) grooming skills, all  -AV     [place] (Grooming Goal 1, OT) modified independence  -AV     Time Frame (Grooming Goal 1, OT) long term goal (LTG); 10 days  -AV     Row Name 11/12/21 1347          Strength Goal 1 (OT)    Strength Goal 1 (OT) Patient will demonstrate  4+/5 upper extremities to increase ADL and transfer independence.  -AV     Time Frame (Strength Goal 1, OT) long term goal (LTG); 10 days  -AV     Row Name 11/12/21 5778          Problem Specific Goal 1 (OT)    Problem Specific Goal 1 (OT) Patient will demonstrate fair/fair plus endurance/activity tolerance needed to support ADLs.  -AV     Time Frame (Problem Specific Goal 1, OT) long term goal (LTG); 10 days  -AV     Row Name 11/12/21 2904          Therapy Assessment/Plan (OT)    Planned Therapy Interventions (OT) activity tolerance training; BADL retraining; functional balance retraining; IADL retraining; occupation/activity based interventions; patient/caregiver education/training; transfer/mobility retraining; strengthening exercise  -AV           User Key  (r) = Recorded By, (t) = Taken By, (c) = Cosigned By    Initials Name Provider Type    AV Mike Bolden, OT Occupational Therapist               Clinical Impression     Row Name 11/12/21 3295          Pain Assessment    Additional Documentation Pain Scale: FACES Pre/Post-Treatment (Group)  -AV     Row Name 11/12/21 8068          Pain Scale: FACES Pre/Post-Treatment    Pain: FACES Scale, Pretreatment 2-->hurts little bit  -AV     Posttreatment Pain Rating 2-->hurts little bit  -AV     Row Name 11/12/21 5143          Plan of Care Review    Plan of Care Reviewed With patient  -AV     Progress no change  First session for evaluation  -AV     Outcome Summary Patient presents with deficits of endurance/activity tolerance, upper extremity strength and likely balance which impacting ADL/transfer independence.  Skilled OT services needed to remediate/compensate for deficits to maximize independence.  -AV     Row Name 11/12/21 5047          Therapy Assessment/Plan (OT)    Patient/Family Therapy Goal Statement (OT) Maximize independence  -AV     Rehab Potential (OT) good, to achieve stated therapy goals  -AV     Criteria for Skilled Therapeutic Interventions Met (OT)  yes; meets criteria; skilled treatment is necessary  -AV     Therapy Frequency (OT) 5 times/wk  -AV     Row Name 11/12/21 1346          Therapy Plan Review/Discharge Plan (OT)    Equipment Needs Upon Discharge (OT) --  3 1 commode, rolling walker, tub transfer bench  -AV     Anticipated Discharge Disposition (OT) sub acute care setting  -AV     Row Name 11/12/21 1346          Vital Signs    O2 Delivery Pre Treatment room air  -AV     O2 Delivery Intra Treatment room air  -AV     O2 Delivery Post Treatment room air  -AV           User Key  (r) = Recorded By, (t) = Taken By, (c) = Cosigned By    Initials Name Provider Type    Mike Martinez OT Occupational Therapist               Outcome Measures     Row Name 11/12/21 1348          How much help from another is currently needed...    Putting on and taking off regular lower body clothing? 2  -AV     Bathing (including washing, rinsing, and drying) 2  -AV     Toileting (which includes using toilet bed pan or urinal) 2  -AV     Putting on and taking off regular upper body clothing 2  -AV     Taking care of personal grooming (such as brushing teeth) 2  -AV     Eating meals 4  -AV     AM-PAC 6 Clicks Score (OT) 14  -AV     Row Name 11/12/21 1348          Functional Assessment    Outcome Measure Options AM-PAC 6 Clicks Daily Activity (OT); Optimal Instrument  -AV     Row Name 11/12/21 1348          Optimal Instrument    Optimal Instrument Optimal - 3  -AV     Bending/Stooping 5  -AV     Standing 5  -AV     Reaching 3  -AV     From the list, choose the 3 activities you would most like to be able to do without any difficulty Bending/stooping; Standing; Reaching  -AV     Total Score Optimal - 3 13  -AV           User Key  (r) = Recorded By, (t) = Taken By, (c) = Cosigned By    Initials Name Provider Type    Mike Martinez OT Occupational Therapist                Occupational Therapy Education                 Title: PT OT SLP Therapies (Done)     Topic: Occupational  Therapy (Done)     Point: ADL training (Done)     Description:   Instruct learner(s) on proper safety adaptation and remediation techniques during self care or transfers.   Instruct in proper use of assistive devices.              Learning Progress Summary           Patient Acceptance, E, VU by DNIH at 11/12/2021 1349                   Point: Home exercise program (Done)     Description:   Instruct learner(s) on appropriate technique for monitoring, assisting and/or progressing therapeutic exercises/activities.              Learning Progress Summary           Patient Acceptance, E, VU by DINH at 11/12/2021 1349                   Point: Precautions (Done)     Description:   Instruct learner(s) on prescribed precautions during self-care and functional transfers.              Learning Progress Summary           Patient Acceptance, E, VU by AV at 11/12/2021 1349                   Point: Body mechanics (Done)     Description:   Instruct learner(s) on proper positioning and spine alignment during self-care, functional mobility activities and/or exercises.              Learning Progress Summary           Patient Acceptance, E, VU by DINH at 11/12/2021 1349                               User Key     Initials Effective Dates Name Provider Type Discipline    DINH 06/16/21 -  Mike Bolden OT Occupational Therapist OT              OT Recommendation and Plan  Planned Therapy Interventions (OT): activity tolerance training, BADL retraining, functional balance retraining, IADL retraining, occupation/activity based interventions, patient/caregiver education/training, transfer/mobility retraining, strengthening exercise  Therapy Frequency (OT): 5 times/wk  Plan of Care Review  Plan of Care Reviewed With: patient  Progress: no change (First session for evaluation)  Outcome Summary: Patient presents with deficits of endurance/activity tolerance, upper extremity strength and likely balance which impacting ADL/transfer independence.  Skilled  OT services needed to remediate/compensate for deficits to maximize independence.     Time Calculation:    Time Calculation- OT     Row Name 11/12/21 1350             Time Calculation- OT    OT Received On 11/12/21  -AV      OT Goal Re-Cert Due Date 11/21/21  -AV              Untimed Charges    OT Eval/Re-eval Minutes 35  -AV              Total Minutes    Untimed Charges Total Minutes 35  -AV       Total Minutes 35  -AV            User Key  (r) = Recorded By, (t) = Taken By, (c) = Cosigned By    Initials Name Provider Type    AV Mike Bolden OT Occupational Therapist              Therapy Charges for Today     Code Description Service Date Service Provider Modifiers Qty    [id number] HC OT EVAL LOW COMPLEXITY 3 11/12/2021 Mike Bolden OT GO 1               Mike Bolden OT  11/12/2021

## 2021-11-12 NOTE — PLAN OF CARE
Goal Outcome Evaluation:  Plan of Care Reviewed With: patient        Progress: no change  Outcome Summary: Patient presents with deficits impacting strength, activity tolerance and likely balance, transfers, and ambulation. Patient will benefit from skilled PT services to address these mobility deficits and decrease risk of falls.

## 2021-11-12 NOTE — SIGNIFICANT NOTE
11/12/21 1440   Wound 11/12/21 1440 Bilateral gluteal Traumatic   Placement Date/Time: 11/12/21 1440   Present on Hospital Admission: Yes  Side: Bilateral  Location: gluteal  Primary Wound Type: Traumatic   Dressing Appearance intact; moist drainage   Base moist; red   Periwound intact; pink; blanchable   Edges open; irregular   Wound Length (cm) 3.1 cm  (lt side 0.5cml)   Wound Width (cm) 1.6 cm  (lt side 1.0cmw)   Drainage Characteristics/Odor serosanguineous   Drainage Amount scant   Care, Wound cleansed with; sterile normal saline   Dressing Care dressing applied; border dressing; silicone; silver impregnated; hydrofiber   Periwound Care barrier film applied   Wound Consult: Hx of Fatty Liver, HTN, Kidney stones. Patient presents with traumatic superficial injuries to bilateral gluteal aspects. Friction and shearing with tearing of outermost tissue layer. No discoloration to periwound tissue. Tissue blanchable and normal for skin tone. Dry skin noted. Patient states she has been in the hospital bed for several weeks. Patient with two surgical incisions to left leg. One to upper lateral thigh and then one to lower lateral leg. Steri-stips present to left lateral lower leg. Skin sealant to left lateral thigh. Edema noted to bilateral lower legs. Closed surgical puncture wounds to right abdomen. Patient states they are from gallbladder surgery.   Eufemia Montaño RN

## 2021-11-13 NOTE — PROGRESS NOTES
[facility]   Hospitalist Progress Note  Date: 2021  Patient Name: Wilber Marcelino  : 1965  MRN: [id number]  Date of admission: 11/10/2021      Subjective   Subjective     Chief Complaint: Blood in stool    Summary: 56-year-old female past medical history significant for fatty liver disease, hypertension, recent admission for rhabdomyolysis secondary to myositis at [facility] muscle biopsy results pending presents from [facility] and rehab after developing blood in her stools.  Of note patient was on a steroid taper as well as lactic Lovenox and aspirin.  Hemoglobin found to be decreased from 16 on previous discharge to 10.  Patient was admitted for further evaluation and treatment of acute blood loss anemia thought secondary to GI bleed.  Gastroenterology consulted. CT the abdomen negative for any acute intra-abdominal processes, demonstrated atelectasis diffuse heterogeneity of L4 vertebral body which had previously been worked up by neurosurgery and neurology at [place].      Interval Followup: Patient lying in bed appears to be resting comfortably.  Patient denies abdominal pain.  Patient denies any further bloody bowel movements overnight.  Patient denies any new issues.  Heart rate 90s to 100s on telemetry.  Blood pressure borderline low.  Satting well on room air.  CK trending up slightly this morning.  White blood cell count within normal limits.  Hemoglobin stable.  Blood cultures negative to date.  No other issues per nursing.      Review of Systems   Constitutional: Positive for fatigue. Negative for fever.   HENT: Negative for sore throat and trouble swallowing.    Eyes: Negative for pain and discharge.   Respiratory: Negative for cough and shortness of breath.    Cardiovascular: Negative for chest pain and palpitations.   Gastrointestinal: Negative for abdominal pain, nausea and vomiting.   Endocrine: Negative for cold intolerance and heat intolerance.    Genitourinary: Negative for difficulty urinating and dysuria.   Musculoskeletal: Positive for myalgias. Negative for back pain and neck stiffness.   Skin: Negative for color change and rash.   Neurological: Positive for weakness. Negative for syncope and headaches.   Hematological: Negative for adenopathy.   Psychiatric/Behavioral: Negative for confusion and hallucinations.       Objective   Objective     Vitals:   Temp:  [97.4 °F (36.3 °C)-98.3 °F (36.8 °C)] 97.9 °F (36.6 °C)  Heart Rate:  [] 102  Resp:  [14-17] 16  BP: ()/(54-76) 107/76  Physical Exam    Constitutional: Awake, alert, no acute distress   Eyes: Pupils equal, sclerae anicteric, no conjunctival injection   HENT: NCAT, mucous membranes moist   Neck: Supple, no thyromegaly, no lymphadenopathy, trachea midline   Respiratory: Clear to auscultation bilaterally, nonlabored respirations    Cardiovascular: RRR, no murmurs, rubs, or gallops, palpable pedal pulses bilaterally   Gastrointestinal: Positive bowel sounds, soft, nontender, nondistended   Musculoskeletal: No bilateral ankle edema, no clubbing or cyanosis to extremities   Psychiatric: Appropriate affect, cooperative thought content tangential   Neurologic: Oriented x 3, 2 out of 5 strength symmetric in all extremities, Cranial Nerves grossly intact to confrontation, speech clear   Skin: No rashes     Result Review    Result Review:  I have personally reviewed the results from the time of this admission to 11/13/2021 16:49 EST and agree with these findings:  [x]  Laboratory  [x]  Microbiology  [x]  Radiology  []  EKG/Telemetry   []  Cardiology/Vascular   []  Pathology  [x]  Old records discharge summary from recent hospitalization at [facility]  []  Other:    Assessment/Plan   Assessment / Plan     Assessment/Plan:  Acute blood loss anemia secondary to GI bleed  GI bleed likely lower GI source  Myositis  Rhabdomyolysis  Fatty liver disease  Leukocytosis likely secondary to  steroids  Likely benign hemangioma of L4 vertebral body previously worked up  Debility with decreased ADLs    Patient admitted for further evaluation treatment  Gastroenterology consulted thank you for assistance  Continue to monitor hemoglobin closely  Continue to transfuse to keep hemoglobin greater than 7  Continue Protonix daily  Continue to hold home aspirin  Continue to hold Lovenox  Plan for prep on Sunday with colonoscopy on Monday  Continue prednisone taper as prescribed on discharge from [place] and monitor CK closely  Continue IV fluids  Continue to trend CK    PT/OT consulted  Likely return to SNF once stable       Discussed plan with RN and gastroenterology attending.    DVT prophylaxis:  Medical DVT prophylaxis orders are present.    CODE STATUS:   Code Status (Patient has no pulse and is not breathing): CPR (Attempt to Resuscitate)  Medical Interventions (Patient has pulse or is breathing): Full Support

## 2021-11-13 NOTE — PLAN OF CARE
"Goal Outcome Evaluation:  Plan of Care Reviewed With: patient           Outcome Summary: Pt refuses to do much for herself. Complained of neck pain. Pt says she's having \"episodes of being choked with phlegm\"--scant white secretions. Yeny Hernandez RN   "

## 2021-11-14 NOTE — PLAN OF CARE
Goal Outcome Evaluation:  Plan of Care Reviewed With: patient           Outcome Summary: Pt still doesn't do much for herself. Chronic pain. Yeny Hernandez RN

## 2021-11-14 NOTE — PROGRESS NOTES
[facility]   Hospitalist Progress Note  Date: 2021  Patient Name: Wilber Marcelino  : 1965  MRN: [id number]  Date of admission: 11/10/2021      Subjective   Subjective     Chief Complaint: Blood in stool    Summary: 56-year-old female past medical history significant for fatty liver disease, hypertension, recent admission for rhabdomyolysis secondary to myositis at [facility] muscle biopsy results pending presents from [facility] and rehab after developing blood in her stools.  Of note patient was on a steroid taper as well as lactic Lovenox and aspirin.  Hemoglobin found to be decreased from 16 on previous discharge to 10.  Patient was admitted for further evaluation and treatment of acute blood loss anemia thought secondary to GI bleed.  Gastroenterology consulted. CT the abdomen negative for any acute intra-abdominal processes, demonstrated atelectasis diffuse heterogeneity of L4 vertebral body which had previously been worked up by neurosurgery and neurology at [place].      Interval Followup: Patient lying in bed appears to be resting comfortably.  Patient's biggest concern today is her stuff being moved out of the skilled nursing facility.  Patient concerned about her insurance coverage.  Patient directed to care coordination and discharge planning.  Patient denies abdominal pain.  Patient denies any bloody bowel movements overnight.  Patient denies any new issues.  Heart rate 70s to 120s on telemetry.  Blood pressure borderline low.  Satting well on room air.  CK trending down slightly this morning.  Hemoglobin stable.  Blood cultures negative to date.  No other issues per nursing.      Review of Systems   Constitutional: Positive for fatigue. Negative for fever.   HENT: Negative for sore throat and trouble swallowing.    Eyes: Negative for pain and discharge.   Respiratory: Negative for cough and shortness of breath.    Cardiovascular: Negative for chest pain  and palpitations.   Gastrointestinal: Negative for abdominal pain, nausea and vomiting.   Endocrine: Negative for cold intolerance and heat intolerance.   Genitourinary: Negative for difficulty urinating and dysuria.   Musculoskeletal: Positive for myalgias. Negative for back pain and neck stiffness.   Skin: Negative for color change and rash.   Neurological: Positive for weakness. Negative for syncope and headaches.   Hematological: Negative for adenopathy.   Psychiatric/Behavioral: Negative for confusion and hallucinations.       Objective   Objective     Vitals:   Temp:  [97.5 °F (36.4 °C)-98.1 °F (36.7 °C)] 98.1 °F (36.7 °C)  Heart Rate:  [100-116] 116  Resp:  [16-20] 16  BP: (102-155)/(71-95) 155/95  Physical Exam    Constitutional: Awake, alert, no acute distress   Eyes: Pupils equal, sclerae anicteric, no conjunctival injection   HENT: NCAT, mucous membranes moist   Neck: Supple, no thyromegaly, no lymphadenopathy, trachea midline   Respiratory: Clear to auscultation bilaterally, nonlabored respirations    Cardiovascular: RRR, no murmurs, rubs, or gallops, palpable pedal pulses bilaterally   Gastrointestinal: Positive bowel sounds, soft, nontender, nondistended   Musculoskeletal: No bilateral ankle edema, no clubbing or cyanosis to extremities   Psychiatric: Appropriate affect, cooperative thought content tangential   Neurologic: Oriented x 3, 2 out of 5 strength symmetric in all extremities, Cranial Nerves grossly intact to confrontation, speech clear   Skin: No rashes     Result Review    Result Review:  I have personally reviewed the results from the time of this admission to 11/14/2021 18:53 EST and agree with these findings:  [x]  Laboratory  [x]  Microbiology  [x]  Radiology  [x]  EKG/Telemetry   []  Cardiology/Vascular   []  Pathology  [x]  Old records discharge summary from recent hospitalization at [facility]  []  Other:    Assessment/Plan   Assessment / Plan     Assessment/Plan:  Acute blood  loss anemia secondary to GI bleed  GI bleed likely lower GI source  Myositis  Rhabdomyolysis  Fatty liver disease  Leukocytosis likely secondary to steroids  Likely benign hemangioma of L4 vertebral body previously worked up  Debility with decreased ADLs    Patient admitted for further evaluation treatment  Gastroenterology consulted thank you for assistance  Continue to monitor hemoglobin closely and transfuse to keep hemoglobin greater than 7  Continue Protonix daily   Continue to hold home aspirin  Continue to hold Lovenox  Plan for colonoscopy tomorrow prep per GI  Continue prednisone taper decreasing by 5 mg every 7 days as prescribed on discharge from [place] and monitor CK closely  Continue IV fluids  Continue to trend CK    PT/OT consulted  Likely return to SNF once stable. Patient lost her bed at [facility]. Discharge planning to coordinate alternate placement.       Discussed plan with RN and gastroenterology attending.    DVT prophylaxis:  Medical DVT prophylaxis orders are present.    CODE STATUS:   Code Status (Patient has no pulse and is not breathing): CPR (Attempt to Resuscitate)  Medical Interventions (Patient has pulse or is breathing): Full Support

## 2021-11-14 NOTE — PLAN OF CARE
Goal Outcome Evaluation:      Patient is going to be getting a colonoscopy done tomorrow bowel prep will be started. Patient states that [facility] had called her about her insurance running out and that they will be packing up her stuff for her to move. Case management is aware of this and they will be looking for new placement. No bloody bowel movements noted.  Luly Vaca RN       Progress: no change

## 2021-11-15 NOTE — ANESTHESIA PREPROCEDURE EVALUATION
Anesthesia Evaluation     Patient summary reviewed and Nursing notes reviewed                Airway   Mallampati: I  TM distance: >3 FB  Neck ROM: full  No difficulty expected  Dental      Pulmonary - negative pulmonary ROS and normal exam    breath sounds clear to auscultation  Cardiovascular     Rhythm: irregular    (+) hypertension,       Neuro/Psych- negative ROS  GI/Hepatic/Renal/Endo    (+)  GI bleeding , liver disease, renal disease,     Musculoskeletal (-) negative ROS    Abdominal    Substance History - negative use     OB/GYN negative ob/gyn ROS         Other                        Anesthesia Plan    ASA 3     general     intravenous induction     Anesthetic plan, all risks, benefits, and alternatives have been provided, discussed and informed consent has been obtained with: patient.

## 2021-11-15 NOTE — THERAPY TREATMENT NOTE
Acute Care - Physical Therapy Treatment Note  YANDY Lind     Patient Name: Wilber Marcelino  : 1965  MRN: [id number]  Today's Date: 11/15/2021      Visit Dx:     ICD-10-CM ICD-9-CM   1. Rectal bleeding  K62.5 569.3   2. Difficulty walking  R26.2 719.7   3. Decreased activities of daily living (ADL)  Z78.9 V49.89     Patient Active Problem List   Diagnosis   • Rectal bleeding     Past Medical History:   Diagnosis Date   • Fatty liver    • Hypertension    • Kidney stone      Past Surgical History:   Procedure Laterality Date   • CHOLECYSTECTOMY     • FINGER SURGERY     • HERNIA REPAIR       PT Assessment (last 12 hours)     PT Evaluation and Treatment     Row Name 11/15/21 1300          Physical Therapy Time and Intention    Subjective Information no complaints  -ZARIA     Document Type therapy note (daily note)  -ZARIA     Mode of Treatment individual therapy; physical therapy  -ZARIA     Row Name 11/15/21 1300          General Information    Patient Observations alert; cooperative; agree to therapy  -ZARIA     Row Name             Wound 21 1440 Bilateral gluteal Traumatic    Wound - Properties Group Placement Date: 21  -FL Placement Time: 1440  -FL Present on Hospital Admission: Y  -FL Side: Bilateral  -FL Location: gluteal  -FL Primary Wound Type: Traumatic  -FL     Retired Wound - Properties Group Date first assessed: 21  -FL Time first assessed: 1440  -FL Present on Hospital Admission: Y  -FL Side: Bilateral  -FL Location: gluteal  -FL Primary Wound Type: Traumatic  -FL     Row Name             Wound 21 1440 Left lower leg Incision    Wound - Properties Group Placement Date: 21  -FL Placement Time: 1440  -FL Present on Hospital Admission: Y  -FL Side: Left  -FL Orientation: lower  -FL Location: leg  -FL Primary Wound Type: Incision  -FL     Retired Wound - Properties Group Date first assessed: 21  -FL Time first assessed: 1440  -FL Present on Hospital Admission: Y  -FL Side: Left  -FL  Location: leg  -FL Primary Wound Type: Incision  -FL     Row Name             Wound 11/12/21 Left upper thigh Incision    Wound - Properties Group Placement Date: 11/12/21  -FL Present on Hospital Admission: Y  -FL Side: Left  -FL Orientation: upper  -FL Location: thigh  -FL Primary Wound Type: Incision  -FL     Retired Wound - Properties Group Date first assessed: 11/12/21  -FL Present on Hospital Admission: Y  -FL Side: Left  -FL Location: thigh  -FL Primary Wound Type: Incision  -FL     Row Name 11/15/21 1300          Progress Summary (PT)    Progress Toward Functional Goals (PT) progress toward functional goals is gradual  -ZARIA     Daily Progress Summary (PT) pt was agreeable to ther ex today. Pt participated well with treatment.  -ZARIA           User Key  (r) = Recorded By, (t) = Taken By, (c) = Cosigned By    Initials Name Provider Type    FL Eufemia Montaño, RN Registered Nurse    Roberto Mercado PT Physical Therapist            Bilateral Lower Extremity   Exercise  Reps  Sets    Short arc quads   10 2   Heel slides  10 2   Ankle pumps  10 2   Quad sets  10 2   Straight leg raise  10 2   Hip ab/adduction 10 2        Physical Therapy Education                 Title: PT OT SLP Therapies (In Progress)     Topic: Physical Therapy (Done)     Point: Mobility training (Done)     Learning Progress Summary           Patient Acceptance, E,TB, VU by AV at 11/12/2021 1406                   Point: Home exercise program (Done)     Learning Progress Summary           Patient Acceptance, E,TB, VU by AV at 11/12/2021 1406                   Point: Body mechanics (Done)     Learning Progress Summary           Patient Acceptance, E,TB, VU by AV at 11/12/2021 1406                   Point: Precautions (Done)     Learning Progress Summary           Patient Acceptance, E,TB, VU by AV at 11/12/2021 1406                               User Key     Initials Effective Dates Name Provider Type Discipline    AV 06/11/21 -  Lloyd Díaz  Cheko, PT Physical Therapist PT              PT Recommendation and Plan     Progress Summary (PT)  Progress Toward Functional Goals (PT): progress toward functional goals is gradual  Daily Progress Summary (PT): pt was agreeable to ther ex today. Pt participated well with treatment.   Outcome Measures     Row Name 11/12/21 1400             How much help from another person do you currently need...    Turning from your back to your side while in flat bed without using bedrails? 2  -AV      Moving from lying on back to sitting on the side of a flat bed without bedrails? 2  -AV      Moving to and from a bed to a chair (including a wheelchair)? 1  -AV      Standing up from a chair using your arms (e.g., wheelchair, bedside chair)? 1  -AV      Climbing 3-5 steps with a railing? 1  -AV      To walk in hospital room? 1  -AV      AM-PAC 6 Clicks Score (PT) 8  -AV              Functional Assessment    Outcome Measure Options AM-PAC 6 Clicks Basic Mobility (PT)  -AV            User Key  (r) = Recorded By, (t) = Taken By, (c) = Cosigned By    Initials Name Provider Type    AV Cheko Sauceda, PT Physical Therapist                 Time Calculation:    PT Charges     Row Name 11/15/21 1338             Time Calculation    PT Received On 11/15/21  -ZARIA              Timed Charges    41562 - PT Therapeutic Exercise Minutes 10  -ZARIA              Total Minutes    Timed Charges Total Minutes 10  -ZARIA       Total Minutes 10  -ZARIA            User Key  (r) = Recorded By, (t) = Taken By, (c) = Cosigned By    Initials Name Provider Type    Roberto Mercado PT Physical Therapist              Therapy Charges for Today     Code Description Service Date Service Provider Modifiers Qty    [id number]  PT THER PROC EA 15 MIN 11/15/2021 Roberto Jeff PT GP 1          PT G-Codes  Outcome Measure Options: AM-PAC 6 Clicks Basic Mobility (PT)  AM-PAC 6 Clicks Score (PT): 12  AM-PAC 6 Clicks Score (OT): 14    Roberto Jeff PT  11/15/2021

## 2021-11-15 NOTE — PROGRESS NOTES
[facility]  Inpatient Progress Note    Reason for Follow Up:  Rectal bleeding    Subjective     Interval History:   Pt presents to endoscopy for colonoscopy for further eval of rectal bleeding.    Current Facility-Administered Medications:   •  acetaminophen (TYLENOL) tablet 650 mg, 650 mg, Oral, Q4H PRN, 650 mg at 11/13/21 0800 **OR** acetaminophen (TYLENOL) 160 MG/5ML solution 650 mg, 650 mg, Oral, Q4H PRN **OR** acetaminophen (TYLENOL) suppository 650 mg, 650 mg, Rectal, Q4H PRN, Bryon Murillo MD  •  albuterol (PROVENTIL) nebulizer solution 0.5% 2.5 mg/0.5mL, 2.5 mg, Nebulization, Q6H PRN, Bryon Murillo MD  •  ALPRAZolam (XANAX) tablet 1 mg, 1 mg, Oral, BID PRN, Bryon Murillo MD, 1 mg at 11/13/21 2001  •  artificial tears (REFRESH LACRI-LUBE) ophthalmic ointment, , Both Eyes, Q1H PRN, Tyson Cole MD  •  cyclobenzaprine (FLEXERIL) tablet 10 mg, 10 mg, Oral, TID PRN, Bryon Murillo MD, 10 mg at 11/15/21 0034  •  lactated ringers infusion, 75 mL/hr, Intravenous, Continuous, Tyson Cole MD, Last Rate: 75 mL/hr at 11/13/21 0809, 75 mL/hr at 11/13/21 0809  •  lactated ringers infusion, 30 mL/hr, Intravenous, Continuous, Lie Edouard MD  •  nitroglycerin (NITROSTAT) SL tablet 0.4 mg, 0.4 mg, Sublingual, Q5 Min PRN, Bryon Murillo MD  •  pantoprazole (PROTONIX) injection 40 mg, 40 mg, Intravenous, BID AC, Bryon Murillo MD, 40 mg at 11/15/21 0828  •  potassium chloride (KLOR-CON) packet 20 mEq, 20 mEq, Oral, TID With Meals, Tyson Cole MD  •  predniSONE (DELTASONE) tablet 60 mg, 60 mg, Oral, Daily With Breakfast, Tyson Cole MD, 60 mg at 11/14/21 0816  •  sodium chloride 0.9 % flush 10 mL, 10 mL, Intravenous, PRN, Claudy Pond MD  •  sodium chloride 0.9 % flush 10 mL, 10 mL, Intravenous, Q12H, Bryon Murillo MD, 10 mL at 11/15/21 0829  •  sodium chloride 0.9 % flush 10 mL, 10 mL, Intravenous, PRN, Lidia,  Bryon Díaz MD  Review of Systems:    The following systems were reviewed and negative;  constitution, respiratory, cardiovascular and gastrointestinal    Objective     Vital Signs  Temp:  [97.7 °F (36.5 °C)-98.6 °F (37 °C)] 97.7 °F (36.5 °C)  Heart Rate:  [] 108  Resp:  [16-20] 20  BP: (113-136)/(67-87) 113/67  Body mass index is 26.01 kg/m².    Intake/Output Summary (Last 24 hours) at 11/15/2021 1531  Last data filed at 11/15/2021 0025  Gross per 24 hour   Intake --   Output 900 ml   Net -900 ml     No intake/output data recorded.     Physical Exam:   General: awake, alert and in no acute distress   Eyes: eyes move symmetrical in all directions, no scleral icterus   Neck: supple, trachea is midline   Skin: warm and dry, not jaundiced   Cardiovascular: no chest tenderness   Pulm: breathing unlabored   Abdomen: soft, nontender, nondistended   Rectal: deferred   Extremities: no rash or edema   Psychiatric: mental status within normal limits, alert and oriented      Results Review:     I reviewed the patient's new clinical results.    Results from last 7 days   Lab Units 11/15/21  0457 11/14/21  0505 11/13/21  0943   WBC 10*3/mm3 10.03 11.13* 9.67   HEMOGLOBIN g/dL 10.1* 9.8* 9.6*   HEMATOCRIT % 29.4* 28.8* 28.0*   PLATELETS 10*3/mm3 340 299 284     Results from last 7 days   Lab Units 11/15/21  0457 11/12/21  0428 11/11/21  0743   SODIUM mmol/L 139 138 141   POTASSIUM mmol/L 3.0* 4.1 3.4*   CHLORIDE mmol/L 101 103 103   CO2 mmol/L 27.8 29.3* 31.6*   BUN mg/dL 4* 12 15   CREATININE mg/dL <0.17* 0.22* 0.26*   CALCIUM mg/dL 7.9* 7.5* 7.7*   BILIRUBIN mg/dL 0.4 0.4 0.5   ALK PHOS U/L 59 54 56   ALT (SGPT) U/L 149* 122* 133*   AST (SGOT) U/L 120* 100* 91*   GLUCOSE mg/dL 82 74 95     Results from last 7 days   Lab Units 11/11/21  0058   INR  1.00*     Lab Results   Lab Value Date/Time    LIPASE 19 10/21/2021 1442       Radiology:  [unfilled]      Assessment/Plan   Assessment:     Rectal bleeding    Plan:      Proceed with colonoscopy as planned  Benefits versus risks of procedure d/w patient; risks include but are not limited to bleeding, infection, perforation, and risk of sedation  Pt understands risks and agrees to proceed    I discussed the patients findings and my recommendations with patient.         Liliam Head M.D.  [place] [id number] [address]  [place]  [id number]  Office: [phone]

## 2021-11-15 NOTE — PLAN OF CARE
Goal Outcome Evaluation:              Outcome Summary: Patient just back from her colonoscopy. She tolerated the procedure well and vitals are stable. Patient is in no pain. Diet has been resumed and she is ready to eat. Will continue to monitor.    León CINTRONN, RN

## 2021-11-15 NOTE — PLAN OF CARE
Goal Outcome Evaluation:  Plan of Care Reviewed With: patient           Outcome Summary: Pt does nothing for herself. She can do more than what she leads one to believe. Today she is going for a colonoscopy. HgB is trending upwards, today it's at 10.1. Yeny Hernandez RN

## 2021-11-15 NOTE — ANESTHESIA POSTPROCEDURE EVALUATION
Patient: Wilber Marcelino    Procedure Summary     Date: 11/15/21 Room / Location: [facility] ENDOSCOPY 4 / [facility] ENDOSCOPY    Anesthesia Start: 1540 Anesthesia Stop: 1613    Procedure: COLONOSCOPY WITH BIOPSY (N/A ) Diagnosis:       Rectal bleeding      (Rectal bleeding [K62.5])    Surgeons: Liliam Head MD Provider: Lei Edouard MD    Anesthesia Type: general ASA Status: 3          Anesthesia Type: general    Vitals  Vitals Value Taken Time   /83 11/15/21 1623   Temp 37.2 °C (98.9 °F) 11/15/21 1613   Pulse 103 11/15/21 1623   Resp 24 11/15/21 1623   SpO2 100 % 11/15/21 1613           Post Anesthesia Care and Evaluation    Patient location during evaluation: bedside  Patient participation: complete - patient participated  Level of consciousness: awake  Pain management: adequate  Airway patency: patent  Anesthetic complications: No anesthetic complications  PONV Status: none  Cardiovascular status: acceptable  Respiratory status: acceptable  Hydration status: acceptable    Comments: An Anesthesiologist personally participated in the most demanding procedures (including induction and emergence if applicable) in the anesthesia plan, monitored the course of anesthesia administration at frequent intervals and remained physically present and available for immediate diagnosis and treatment of emergencies.

## 2021-11-16 NOTE — THERAPY TREATMENT NOTE
Acute Care - Physical Therapy Treatment Note   [place]     Patient Name: Wilber Marcelino  : 1965  MRN: [id number]  Today's Date: 2021      Visit Dx:     ICD-10-CM ICD-9-CM   1. Rectal bleeding  K62.5 569.3   2. Difficulty walking  R26.2 719.7   3. Decreased activities of daily living (ADL)  Z78.9 V49.89     Patient Active Problem List   Diagnosis   • Rectal bleeding     Past Medical History:   Diagnosis Date   • Fatty liver    • Hypertension    • Kidney stone      Past Surgical History:   Procedure Laterality Date   • CHOLECYSTECTOMY     • COLONOSCOPY N/A 11/15/2021    Procedure: COLONOSCOPY WITH BIOPSY;  Surgeon: Liliam Head MD;  Location: formerly [facility] ENDOSCOPY;  Service: Gastroenterology;  Laterality: N/A;  RECTUM ULCER   • FINGER SURGERY     • HERNIA REPAIR       PT Assessment (last 12 hours)     PT Evaluation and Treatment     Row Name 21 1300          Physical Therapy Time and Intention    Subjective Information no complaints  -ZARIA     Document Type therapy note (daily note)  -ZARIA     Mode of Treatment individual therapy; physical therapy  -ZARIA     Patient Effort good  -ZARIA     Row Name 21 1300          General Information    Patient Observations alert; cooperative; agree to therapy  -ZARIA     Row Name 21 1300          Bed Mobility    Bed Mobility rolling left; rolling right; supine-sit  -ZARIA     Rolling Left [place] (Bed Mobility) minimum assist (75% patient effort)  -ZARIA     Rolling Right [place] (Bed Mobility) minimum assist (75% patient effort)  -ZARIA     Supine-Sit [place] (Bed Mobility) maximum assist (25% patient effort)  -ZARIA     Row Name 21 1300          Transfers    Transfers sit-stand transfer  -ZAIRA     Sit-Stand [place] (Transfers) other (see comments)  unable to stand due to severe LE weakness  -ZARIA     Row Name 21 1300          Balance    Balance Assessment sitting static balance  -ZARIA     Static Sitting Balance severe impairment; unsupported;  sitting, edge of bed  -ZARIA     Row Name 11/16/21 1300          Motor Skills    Therapeutic Exercise hip; knee; ankle  -ZARIA     Row Name 11/16/21 1300          Hip (Therapeutic Exercise)    Hip (Therapeutic Exercise) AAROM (active assistive range of motion)  -ZARIA     Hip AAROM (Therapeutic Exercise) flexion; aBduction; aDduction  -ZARIA     Row Name 11/16/21 1300          Knee (Therapeutic Exercise)    Knee (Therapeutic Exercise) AAROM (active assistive range of motion)  -ZARIA     Knee AAROM (Therapeutic Exercise) flexion; extension  -ZARIA     Row Name 11/16/21 1300          Ankle (Therapeutic Exercise)    Ankle (Therapeutic Exercise) AAROM (active assistive range of motion)  -ZARIA     Ankle AAROM (Therapeutic Exercise) dorsiflexion; plantarflexion  -ZARIA     Row Name             Wound 11/12/21 1440 Bilateral gluteal Traumatic    Wound - Properties Group Placement Date: 11/12/21  -FL Placement Time: 1440  -FL Present on Hospital Admission: Y  -FL Side: Bilateral  -FL Location: gluteal  -FL Primary Wound Type: Traumatic  -FL     Retired Wound - Properties Group Date first assessed: 11/12/21  -FL Time first assessed: 1440  -FL Present on Hospital Admission: Y  -FL Side: Bilateral  -FL Location: gluteal  -FL Primary Wound Type: Traumatic  -FL     Row Name             Wound 11/12/21 1440 Left lower leg Incision    Wound - Properties Group Placement Date: 11/12/21  -FL Placement Time: 1440  -FL Present on Hospital Admission: Y  -FL Side: Left  -FL Orientation: lower  -FL Location: leg  -FL Primary Wound Type: Incision  -FL     Retired Wound - Properties Group Date first assessed: 11/12/21  -FL Time first assessed: 1440  -FL Present on Hospital Admission: Y  -FL Side: Left  -FL Location: leg  -FL Primary Wound Type: Incision  -FL     Row Name             Wound 11/12/21 Left upper thigh Incision    Wound - Properties Group Placement Date: 11/12/21  -FL Present on Hospital Admission: Y  -FL Side: Left  -FL Orientation: upper  -FL  Location: thigh  -FL Primary Wound Type: Incision  -FL     Retired Wound - Properties Group Date first assessed: 11/12/21  -FL Present on Hospital Admission: Y  -FL Side: Left  -FL Location: thigh  -FL Primary Wound Type: Incision  -FL     Row Name 11/16/21 1300          Progress Summary (PT)    Progress Toward Functional Goals (PT) progress toward functional goals is gradual  -ZARIA     Daily Progress Summary (PT) Pt better able to tolearte transfers today including sitting on the side of the bed for the first time this admission.  -ZARIA           User Key  (r) = Recorded By, (t) = Taken By, (c) = Cosigned By    Initials Name Provider Type    FL Eufemia Montaño, RN Registered Nurse    Roberto Mercado, PT Physical Therapist                Physical Therapy Education                 Title: PT OT SLP Therapies (In Progress)     Topic: Physical Therapy (Done)     Point: Mobility training (Done)     Learning Progress Summary           Patient Acceptance, E,TB, VU by  at 11/12/2021 1406                   Point: Home exercise program (Done)     Learning Progress Summary           Patient Acceptance, E,TB, VU by AV at 11/12/2021 1406                   Point: Body mechanics (Done)     Learning Progress Summary           Patient Acceptance, E,TB, VU by AV at 11/12/2021 1406                   Point: Precautions (Done)     Learning Progress Summary           Patient Acceptance, E,TB, VU by AV at 11/12/2021 1406                               User Key     Initials Effective Dates Name Provider Type Discipline     06/11/21 -  Cheko Sauceda, PT Physical Therapist PT              PT Recommendation and Plan     Progress Summary (PT)  Progress Toward Functional Goals (PT): progress toward functional goals is gradual  Daily Progress Summary (PT): Pt better able to tolearte transfers today including sitting on the side of the bed for the first time this admission.   Outcome Measures     Row Name 11/16/21 1300             How  much help from another person do you currently need...    Turning from your back to your side while in flat bed without using bedrails? 3  -ZARIA      Moving from lying on back to sitting on the side of a flat bed without bedrails? 2  -ZARIA      Moving to and from a bed to a chair (including a wheelchair)? 1  -ZARIA      Standing up from a chair using your arms (e.g., wheelchair, bedside chair)? 1  -ZARIA      Climbing 3-5 steps with a railing? 1  -ZARIA      To walk in hospital room? 1  -ZARIA      AM-PAC 6 Clicks Score (PT) 9  -ZARIA              Functional Assessment    Outcome Measure Options AM-PAC 6 Clicks Basic Mobility (PT)  -ZARIA            User Key  (r) = Recorded By, (t) = Taken By, (c) = Cosigned By    Initials Name Provider Type    Roberto Mercado PT Physical Therapist                 Time Calculation:    PT Charges     Row Name 11/16/21 1347             Time Calculation    PT Received On 11/16/21  -ZARIA              Timed Charges    72375 - PT Therapeutic Exercise Minutes 15  -ZARIA      17803 - PT Therapeutic Activity Minutes 9  -ZARIA              Total Minutes    Timed Charges Total Minutes 24  -ZARIA       Total Minutes 24  -ZARIA            User Key  (r) = Recorded By, (t) = Taken By, (c) = Cosigned By    Initials Name Provider Type    Roberto Mercado PT Physical Therapist              Therapy Charges for Today     Code Description Service Date Service Provider Modifiers Qty    53298280824  PT THER PROC EA 15 MIN 11/15/2021 Roberto Jeff, PT GP 1    [id number]  PT THER PROC EA 15 MIN 11/16/2021 Roberto Jeff, PT GP 1    [id number]  PT THERAPEUTIC ACT EA 15 MIN 11/16/2021 Roberto Jeff, PT GP 1          PT G-Codes  Outcome Measure Options: AM-PAC 6 Clicks Basic Mobility (PT)  AM-PAC 6 Clicks Score (PT): 9  AM-PAC 6 Clicks Score (OT): 14    Roberto Jeff PT  11/16/2021

## 2021-11-16 NOTE — PROGRESS NOTES
[facility]   Hospitalist Progress Note  Date: 2021  Patient Name: Wilber Marcelino  : 1965  MRN: [id number]  Date of admission: 11/10/2021      Subjective   Subjective     Chief Complaint: Blood in stool    Summary: 56-year-old female past medical history significant for fatty liver disease, hypertension, recent admission for rhabdomyolysis secondary to myositis at [facility] currently on prednisone taper, muscle biopsy results pending, presents from [facility] after developing blood in her stools.  Of note patient was on a steroid taper as well as prophylactic Lovenox and aspirin.  Hemoglobin found to be decreased from 16 on previous discharge to 10.  Patient was admitted for further evaluation and treatment of acute blood loss anemia thought secondary to GI bleed.  Gastroenterology consulted. CT the abdomen negative for any acute intra-abdominal processes, demonstrated atelectasis diffuse heterogeneity of L4 vertebral body which had previously been worked up by neurosurgery and neurology at [place].  Colonoscopy performed demonstrated full ulcerations of the rectum.  Hemoglobin stabilized.  Pre-CERT pending at [facility] for patient to return.    Interval Followup:   No acute issues overnight.  Patient's hemoglobin has been stable over the past 24 hours.  Still waiting final pre-CERT at [facility] before patient can return.  Patient's potassium remains low, continue to supplement.  Patient states she has been working with PT and OT, has been doing better than previously.  Patient remains medically ready to discharge, simply awaiting pre-CERT.    ROS:  No further issues of hematochezia.  No chest pain no palpitations.    Objective   Objective     Vitals:   Temp:  [97 °F (36.1 °C)-99.5 °F (37.5 °C)] 99.5 °F (37.5 °C)  Heart Rate:  [] 109  Resp:  [16-27] 18  BP: ()/(52-86) 118/68  Flow (L/min):  [2] 2  Physical Exam    Constitutional: Awake, alert,  no acute distress   Eyes: Pupils equal, sclerae anicteric, no conjunctival injection   HENT: NCAT, mucous membranes moist   Neck: Supple, no thyromegaly, no lymphadenopathy, trachea midline   Respiratory: Clear to auscultation bilaterally, nonlabored respirations    Cardiovascular: RRR, no murmurs, rubs, or gallops, palpable pedal pulses bilaterally   Gastrointestinal: Positive bowel sounds, soft, nontender, nondistended   Musculoskeletal: No bilateral ankle edema, no clubbing or cyanosis to extremities   Psychiatric: Appropriate affect, cooperative thought content tangential   Neurologic: Oriented x 3, 2 out of 5 strength symmetric in all extremities, Cranial Nerves grossly intact to confrontation, speech clear   Skin: No rashes     Result Review    Result Review:  I have personally reviewed the results from the time of this admission to 11/16/2021 15:03 EST and agree with these findings:  [x]  Laboratory  [x]  Microbiology  [x]  Radiology  [x]  EKG/Telemetry   []  Cardiology/Vascular   []  Pathology  [x]  Old records discharge summary from recent hospitalization at [facility]  []  Other:    Assessment/Plan   Assessment / Plan     Assessment/Plan:  Acute blood loss anemia secondary to GI bleed due to rectal ulceration  GI bleed due to rectal ulcerations  Rectal ulcerations  Myositis  Rhabdomyolysis  Fatty liver disease  Leukocytosis likely secondary to steroids  Likely benign hemangioma of L4 vertebral body previously worked up  Debility with decreased ADLs  Hypokalemia    Plan:  Patient admitted for further evaluation treatment  Gastroenterology consulted thank you for assistance  Globin remained stable, no needs for transfusion  Continue Protonix daily   Continue to hold home aspirin  Continue to hold Lovenox  Follow-up with gastroenterology following discharge for results of biopsies  Start Colace twice daily to avoid constipation  Continue prednisone taper decreasing by 5 mg every 7 days as prescribed on  discharge from [place] and monitor CK closely  Continue to replace potassium p.o.  Continue to trend CK    PT/OT consulted  Will return to SNF once pre-CERT pending at [place] has been approved       Discussed plan with RN and gastroenterology.    DVT prophylaxis:  Medical DVT prophylaxis orders are present.    CODE STATUS:   Code Status (Patient has no pulse and is not breathing): CPR (Attempt to Resuscitate)  Medical Interventions (Patient has pulse or is breathing): Full Support        Electronically signed by Ean Morgan MD, 11/16/21, 3:08 PM EST.    WBC   Date Value Ref Range Status   11/15/2021 10.03 3.40 - 10.80 10*3/mm3 Final     RBC   Date Value Ref Range Status   11/15/2021 3.63 (L) 3.77 - 5.28 10*6/mm3 Final     Hemoglobin   Date Value Ref Range Status   11/15/2021 10.1 (L) 12.0 - 15.9 g/dL Final     Hematocrit   Date Value Ref Range Status   11/15/2021 29.4 (L) 34.0 - 46.6 % Final     MCV   Date Value Ref Range Status   11/15/2021 81.0 79.0 - 97.0 fL Final     MCH   Date Value Ref Range Status   11/15/2021 27.8 26.6 - 33.0 pg Final     MCHC   Date Value Ref Range Status   11/15/2021 34.4 31.5 - 35.7 g/dL Final     RDW   Date Value Ref Range Status   11/15/2021 18.3 (H) 12.3 - 15.4 % Final     RDW-SD   Date Value Ref Range Status   11/15/2021 48.6 37.0 - 54.0 fl Final     MPV   Date Value Ref Range Status   11/15/2021 9.1 6.0 - 12.0 fL Final     Platelets   Date Value Ref Range Status   11/15/2021 340 140 - 450 10*3/mm3 Final       Lab Results   Component Value Date    GLUCOSE 82 11/15/2021    BUN 4 (L) 11/15/2021    CREATININE <0.17 (L) 11/15/2021    EGFRIFNONA >150 11/11/2021    EGFRIFAFRI >150 11/12/2021    BCR  11/15/2021      Comment:      Unable to calculate Bun/Crea Ratio.    K 3.0 (L) 11/15/2021    CO2 27.8 11/15/2021    CALCIUM 7.9 (L) 11/15/2021    ALBUMIN 3.00 (L) 11/15/2021    LABIL2 1.2 07/28/2021     (H) 11/15/2021     (H) 11/15/2021       CT Abdomen Pelvis Without  Contrast    Result Date: 11/11/2021  PROCEDURE: CT ABDOMEN PELVIS WO CONTRAST  COMPARISON: [facility], CR, XR CHEST 1 VW, 11/11/2021, 0:38.  INDICATIONS: Rectal bleeding, generalized abdominal pain, leukocytosis  TECHNIQUE: 479 CT images were created without intravenous contrast.  Oral contrast agent was administered for the study  PROTOCOL:   Standard imaging protocol performed    RADIATION:   DLP: 1,068 mGy*cm   Automated exposure control was utilized to minimize radiation dose.  FINDINGS: There is anasarca.  The patient's upper extremities within the scan field of view obscure detail.  No mechanical bowel obstruction is suggested.  No pneumoperitoneum or pneumatosis.  No definite acute colitis or diverticulitis by nonenhanced CT examination.  No acute appendicitis.  The areas of relative narrowing involving the colon are thought to be related to peristaltic artifacts, such as involving the transverse colon and the sigmoid colon and portions of the descending colon.   There is possible hepatomegaly with the maximum craniocaudal dimension of the right lobe of the liver measuring 19.2 cm.  The patient has undergone cholecystectomy.  There is suspected compensatory dilatation of the biliary tree with the common bile duct measuring about 1 cm in maximum diameter.  No choledocholithiasis is seen.  The spleen is small in size.  No acute pancreatitis.  No renal stones or hydronephrosis or obstructive uropathy.  Atherosclerotic change involves the aortoiliac arterial system without aneurysmal dilatation.  There may be a tiny umbilical hernia.  It does not contain bowel.  There is possible minimal nodularity of the posterior aspect of the right adrenal gland, as seen on axial image 24 of series 2, with a CT number of -18 (negative eighteen) Hounsfield units.  In the differential diagnosis would be a benign adenoma.  A partial volume average artifact would also be in the differential diagnosis also, as a  definite right adrenal nodule is not clearly appreciated on the sagittal or coronal two-dimensional (2D) images.  No left adrenal nodule is suggested.   No definite nonenhanced CT evidence of a suspicious uterine or adnexal mass.  There are pelvic phleboliths.  There is mild to moderate distension of the urinary bladder.  No urinary bladder calculi or urinary bladder wall thickening is suggested.  No acute intraperitoneal or retroperitoneal hemorrhage.   There is diffuse heterogeneity of the L4 vertebral body, which is nonspecific.  It may represent an intraosseous hemangioma (or venous malformation), measuring about 3.7 cm in greatest transverse diameter.  In the differential diagnosis would be a primary bone tumor, such is multiple myeloma or plasmacytoma.  Consider further imaging assessment, such as with a Nuclear Medicine (NM) whole-body bone scan or lumbar spine MRI examination if clinically warranted and if not contraindicated.  The finding is thought less likely to represent osteomyelitis or to be a manifestation of infectious spondylodiscitis.  No pathologic fracture is suggested.  There is chronic anterolisthesis suspected at L4-5, estimated at 7 mm.  No such findings are seen elsewhere.  Mild to moderate degenerative changes involve the imaged spine.  There are mild degenerative changes of bilateral sacroiliac joints.  There may be minimal degenerative change the bilateral hip joints.  Pubic osteitis is suspected.   There is atelectasis and/or infiltrate(s) in the lower lobe of the left lung.  Pneumonia cannot be excluded.  Atelectasis alone may account for the finding.  No pneumothorax.  Probably no cardiac enlargement is seen.  A trace amount of pericardial effusion is suggested.  Minimal if any left pleural effusion is seen.  No right pleural effusion.   CONCLUSION:  1. There is anasarca.  2. Atelectasis and/or pneumonia may be present within the lower lobe of the left lung.  3. A tiny left pleural  effusion is possible.  4. There is diffuse heterogeneity involving the L4 vertebral body density, which may be related to a benign intraosseous hemangioma.  A malignant process cannot be excluded, such as a plasmacytoma.  No associated pathologic fracture.  Consider Nuclear Medicine (NM) whole-body bone scan and/or lumbar spine MRI examination for further imaging assessment if clinically warranted and if not contraindicated.  5. There is nonspecific distension of the urinary bladder.  No definite urinary bladder wall thickening or urinary bladder calculi.  No hydronephrosis or obstructive uropathy due to a ureteral calculus.  No definite nonobstructing nephrolithiasis.  6. No definite acute colitis or diverticulitis.  No definite pathologic bowel wall thickening.  No mechanical bowel obstruction.  No acute appendicitis.  No pneumoperitoneum or pneumatosis.  7. Otherwise, no acute findings are seen by nonenhanced CT examination.  8. Please see above comments for further detail.   1.   OG STACK JR, MD       Electronically Signed and Approved By: OG STACK JR, MD on 11/11/2021 at 23:21             XR Chest 1 View    Result Date: 11/11/2021  PROCEDURE: XR CHEST 1 VW  COMPARISON: None.  INDICATIONS: SEVERE SEPSIS TRIAGE PROTOCOL.  FINDINGS: A single AP supine portable chest radiograph is provided for review.  There is pulmonary hypoinflation.  Probably no cardiac enlargement.  Hyperdensity is seen within the partially imaged bowel and may represent residual oral contrast agent, especially in the left colon.  The patient has undergone cholecystectomy.  There may be mild subsegmental atelectasis in the lung bases.  Probably no acute infiltrate.  No definite pneumothorax is seen.  A tiny left pleural effusion is possible.  CONCLUSION: Pulmonary hypoinflation is seen.  Probably no acute infiltrate.  A tiny left pleural effusion is possible.  Probably no cardiac enlargement.  Residual oral contrast agent is  suggested as discussed.      OG STACK JR, MD       Electronically Signed and Approved By: OG STACK JR, MD on 11/11/2021 at 1:36

## 2021-11-16 NOTE — PROGRESS NOTES
[facility]   Hospitalist Progress Note  Date: 11/15/2021  Patient Name: Wilber Marcelino  : 1965  MRN: [id number]  Date of admission: 11/10/2021      Subjective   Subjective     Chief Complaint: Blood in stool    Summary: 56-year-old female past medical history significant for fatty liver disease, hypertension, recent admission for rhabdomyolysis secondary to myositis at [facility] currently on prednisone taper, muscle biopsy results pending, presents from [facility] after developing blood in her stools.  Of note patient was on a steroid taper as well as prophylactic Lovenox and aspirin.  Hemoglobin found to be decreased from 16 on previous discharge to 10.  Patient was admitted for further evaluation and treatment of acute blood loss anemia thought secondary to GI bleed.  Gastroenterology consulted. CT the abdomen negative for any acute intra-abdominal processes, demonstrated atelectasis diffuse heterogeneity of L4 vertebral body which had previously been worked up by neurosurgery and neurology at [place].  Colonoscopy performed demonstrated full ulcerations of the rectum.  Hemoglobin stabilized.  Pre-CERT pending at [facility] [place] for patient to return.      Interval Followup: Patient lying in bed appears to be resting comfortably prior to colonoscopy this afternoon. Patient denies abdominal pain.  Patient denies any bloody bowel movements overnight.  Patient eager to report that she completed her bowel prep.  Patient denies any new issues.  Heart rate 80s to 110s on telemetry.  Blood pressure stable.  Satting well on room air.  CK trending down slightly this morning.  Serum potassium low this morning.  Hemoglobin stable.  Blood cultures negative to date.  No other issues per nursing.      Review of Systems   Constitutional: Positive for fatigue. Negative for fever.   HENT: Negative for sore throat and trouble swallowing.    Eyes: Negative for pain and discharge.    Respiratory: Negative for cough and shortness of breath.    Cardiovascular: Negative for chest pain and palpitations.   Gastrointestinal: Negative for abdominal pain, nausea and vomiting.   Endocrine: Negative for cold intolerance and heat intolerance.   Genitourinary: Negative for difficulty urinating and dysuria.   Musculoskeletal: Positive for myalgias. Negative for back pain and neck stiffness.   Skin: Negative for color change and rash.   Neurological: Positive for weakness. Negative for syncope and headaches.   Hematological: Negative for adenopathy.   Psychiatric/Behavioral: Negative for confusion and hallucinations.       Objective   Objective     Vitals:   Temp:  [97 °F (36.1 °C)-98.9 °F (37.2 °C)] 97.7 °F (36.5 °C)  Heart Rate:  [] 86  Resp:  [16-27] 18  BP: ()/(52-86) 121/86  Flow (L/min):  [2] 2  Physical Exam    Constitutional: Awake, alert, no acute distress   Eyes: Pupils equal, sclerae anicteric, no conjunctival injection   HENT: NCAT, mucous membranes moist   Neck: Supple, no thyromegaly, no lymphadenopathy, trachea midline   Respiratory: Clear to auscultation bilaterally, nonlabored respirations    Cardiovascular: RRR, no murmurs, rubs, or gallops, palpable pedal pulses bilaterally   Gastrointestinal: Positive bowel sounds, soft, nontender, nondistended   Musculoskeletal: No bilateral ankle edema, no clubbing or cyanosis to extremities   Psychiatric: Appropriate affect, cooperative thought content tangential   Neurologic: Oriented x 3, 2 out of 5 strength symmetric in all extremities, Cranial Nerves grossly intact to confrontation, speech clear   Skin: No rashes     Result Review    Result Review:  I have personally reviewed the results from the time of this admission to 11/15/2021 20:13 EST and agree with these findings:  [x]  Laboratory  [x]  Microbiology  [x]  Radiology  [x]  EKG/Telemetry   []  Cardiology/Vascular   []  Pathology  [x]  Old records discharge summary from recent  hospitalization at [facility]  []  Other:    Assessment/Plan   Assessment / Plan     Assessment/Plan:  Acute blood loss anemia secondary to GI bleed due to rectal ulceration  GI bleed due to rectal ulcerations  Rectal ulcerations  Myositis  Rhabdomyolysis  Fatty liver disease  Leukocytosis likely secondary to steroids  Likely benign hemangioma of L4 vertebral body previously worked up  Debility with decreased ADLs  Hypokalemia    Patient admitted for further evaluation treatment  Gastroenterology consulted thank you for assistance  Continue to monitor hemoglobin closely and transfuse to keep hemoglobin greater than 7  Continue Protonix daily   Continue to hold home aspirin  Continue to hold Lovenox  Follow-up with gastroenterology following discharge for results of biopsies  Start Colace twice daily to avoid constipation  Continue prednisone taper decreasing by 5 mg every 7 days as prescribed on discharge from [facility] and monitor CK closely  Continue IV fluids  Replace potassium p.o.  Continue to trend CK    PT/OT consulted  Likely return to SNF once stable.  Pre-CERT pending at [facility]       Discussed plan with RN and gastroenterology attending.    DVT prophylaxis:  Medical DVT prophylaxis orders are present.    CODE STATUS:   Code Status (Patient has no pulse and is not breathing): CPR (Attempt to Resuscitate)  Medical Interventions (Patient has pulse or is breathing): Full Support

## 2021-11-16 NOTE — PROGRESS NOTES
[facility]  Inpatient Progress Note    Reason for Follow Up:  Rectal bleeding    Subjective     Interval History:   No bowel movements since colonoscopy.  Tolerating po.  No abd pain.    Current Facility-Administered Medications:   •  acetaminophen (TYLENOL) tablet 650 mg, 650 mg, Oral, Q4H PRN, 650 mg at 11/13/21 0800 **OR** acetaminophen (TYLENOL) 160 MG/5ML solution 650 mg, 650 mg, Oral, Q4H PRN **OR** acetaminophen (TYLENOL) suppository 650 mg, 650 mg, Rectal, Q4H PRN, Liliam Head MD  •  albuterol (PROVENTIL) nebulizer solution 0.5% 2.5 mg/0.5mL, 2.5 mg, Nebulization, Q6H PRN, Liliam Head MD  •  ALPRAZolam (XANAX) tablet 1 mg, 1 mg, Oral, BID PRN, Liliam Head MD, 1 mg at 11/15/21 2135  •  artificial tears (REFRESH LACRI-LUBE) ophthalmic ointment, , Both Eyes, Q1H PRN, Liliam Head MD, Given at 11/15/21 2114  •  cyclobenzaprine (FLEXERIL) tablet 10 mg, 10 mg, Oral, TID PRN, Liliam Head MD, 10 mg at 11/15/21 2135  •  docusate sodium (COLACE) capsule 100 mg, 100 mg, Oral, BID, Tyson Cole MD  •  nitroglycerin (NITROSTAT) SL tablet 0.4 mg, 0.4 mg, Sublingual, Q5 Min PRN, Liliam Head MD  •  pantoprazole (PROTONIX) injection 40 mg, 40 mg, Intravenous, BID AC, Liliam Head MD, 40 mg at 11/16/21 0829  •  potassium chloride (KLOR-CON) packet 20 mEq, 20 mEq, Oral, TID With Meals, Liliam Head MD, 20 mEq at 11/15/21 1839  •  predniSONE (DELTASONE) tablet 60 mg, 60 mg, Oral, Daily With Breakfast, Liliam Head MD, 60 mg at 11/16/21 0829  •  sodium chloride 0.9 % flush 10 mL, 10 mL, Intravenous, PRN, Liliam Head MD  •  sodium chloride 0.9 % flush 10 mL, 10 mL, Intravenous, Q12H, Liliam Head MD, 10 mL at 11/16/21 0829  •  sodium chloride 0.9 % flush 10 mL, 10 mL, Intravenous, PRN, Liliam Head MD  Review of Systems:    The following systems were reviewed and  negative;  constitution, respiratory, cardiovascular and gastrointestinal    Objective     Vital Signs  Temp:  [97 °F (36.1 °C)-98.9 °F (37.2 °C)] 97.7 °F (36.5 °C)  Heart Rate:  [] 113  Resp:  [16-27] 18  BP: ()/(52-86) 111/71  Body mass index is 26.01 kg/m².    Intake/Output Summary (Last 24 hours) at 11/16/2021 1051  Last data filed at 11/16/2021 1025  Gross per 24 hour   Intake 570 ml   Output --   Net 570 ml     I/O this shift:  In: 120 [P.O.:120]  Out: -      Physical Exam:   General: awake, alert and in no acute distress   Eyes: eyes move symmetrical in all directions, no scleral icterus   Neck: supple, trachea is midline   Skin: warm and dry, not jaundiced   Cardiovascular: no chest tenderness   Pulm: breathing unlabored   Abdomen: soft, nontender, nondistended   Rectal: deferred   Extremities: no rash or edema   Psychiatric: mental status within normal limits     Results Review:     I reviewed the patient's new clinical results.    Results from last 7 days   Lab Units 11/15/21  0457 11/14/21  0505 11/13/21  0943   WBC 10*3/mm3 10.03 11.13* 9.67   HEMOGLOBIN g/dL 10.1* 9.8* 9.6*   HEMATOCRIT % 29.4* 28.8* 28.0*   PLATELETS 10*3/mm3 340 299 284     Results from last 7 days   Lab Units 11/15/21  0457 11/12/21  0428 11/11/21  0743   SODIUM mmol/L 139 138 141   POTASSIUM mmol/L 3.0* 4.1 3.4*   CHLORIDE mmol/L 101 103 103   CO2 mmol/L 27.8 29.3* 31.6*   BUN mg/dL 4* 12 15   CREATININE mg/dL <0.17* 0.22* 0.26*   CALCIUM mg/dL 7.9* 7.5* 7.7*   BILIRUBIN mg/dL 0.4 0.4 0.5   ALK PHOS U/L 59 54 56   ALT (SGPT) U/L 149* 122* 133*   AST (SGOT) U/L 120* 100* 91*   GLUCOSE mg/dL 82 74 95     Results from last 7 days   Lab Units 11/11/21  0058   INR  1.00*     Lab Results   Lab Value Date/Time    LIPASE 19 10/21/2021 1442       Radiology:  [unfilled]      Assessment/Plan   Assessment:     Rectal bleeding    Plan:     Colonoscopy yesterday with ulcerated rectum; biopsied  Differential would include solitary  rectal ulcer syndrome, stercoral ulcer, ischemia, or other  Hgb stable  My office to contact patient with path results and arrange f/u appointment  OK to d/c from GI standpoint    I discussed the patients findings and my recommendations with patient.         Liliam Head M.D.  [place] [address]  [place]  [id number]  Office: [phone]

## 2021-11-16 NOTE — THERAPY TREATMENT NOTE
Patient Name: Wilber Marcelino  : 1965    MRN: [id number]                              Today's Date: 2021       Admit Date: 11/10/2021    Visit Dx:     ICD-10-CM ICD-9-CM   1. Rectal bleeding  K62.5 569.3   2. Difficulty walking  R26.2 719.7   3. Decreased activities of daily living (ADL)  Z78.9 V49.89     Patient Active Problem List   Diagnosis   • Rectal bleeding     Past Medical History:   Diagnosis Date   • Fatty liver    • Hypertension    • Kidney stone      Past Surgical History:   Procedure Laterality Date   • CHOLECYSTECTOMY     • COLONOSCOPY N/A 11/15/2021    Procedure: COLONOSCOPY WITH BIOPSY;  Surgeon: Liliam Head MD;  Location: [facility] ENDOSCOPY;  Service: Gastroenterology;  Laterality: N/A;  RECTUM ULCER   • FINGER SURGERY     • HERNIA REPAIR        General Information     Row Name 21 1550          OT Time and Intention    Document Type therapy note (daily note)  -ES     Mode of Treatment individual therapy; occupational therapy  -ES     Row Name 21 2703          Cognition    Orientation Status (Cognition) oriented x 3  Patient pleasant and cooperative  -ES     Row Name 21 0427          Safety Issues, Functional Mobility    Safety Issues Affecting Function (Mobility) insight into deficits/self-awareness; safety precaution awareness  -ES     Impairments Affecting Function (Mobility) balance; endurance/activity tolerance; strength  -ES           User Key  (r) = Recorded By, (t) = Taken By, (c) = Cosigned By    Initials Name Provider Type    ES Emily Cheng OT Occupational Therapist                 Mobility/ADL's     Row Name 21 4364          Bed Mobility    Bed Mobility rolling left; rolling right; scooting/bridging  -ES     Rolling Left [place] (Bed Mobility) minimum assist (75% patient effort)  -ES     Rolling Right [place] (Bed Mobility) minimum assist (75% patient effort)  -ES     Scooting/Bridging [place] (Bed Mobility) minimum assist (75%  patient effort)  -ES     Comment (Bed Mobility) Patient min A to roll L and R for bed level toileting task. Cues provided to reach for bed rails for patient assistance with rolling  -ES     Row Name 11/16/21 1551          Activities of Daily Living    BADL Assessment/Intervention --  Patient requires min A for pericare completion with bed level toileting task. Patient able to complete front pericare, requires assistance with bottom.  -ES           User Key  (r) = Recorded By, (t) = Taken By, (c) = Cosigned By    Initials Name Provider Type    Emily Sr OT Occupational Therapist               Obj/Interventions     Row Name 11/16/21 0102          Motor Skills    Functional Endurance poor  -ES           User Key  (r) = Recorded By, (t) = Taken By, (c) = Cosigned By    Initials Name Provider Type    Emily Sr OT Occupational Therapist               Goals/Plan    No documentation.                Clinical Impression     Row Name 11/16/21 0945          Plan of Care Review    Plan of Care Reviewed With patient  -ES     Progress no change  -ES     Outcome Summary Patient with fair participation in therapy session. Patient declines EOB participation, agreeable to bed level only. Patient completes toileting task this session, min A required for buttox pericare, patient able to complete front pericare provided s/u. Patient demos increased use of UEs with ADL completion. Patient min A to roll L and R with toileting, cues provided for hand placement with rolling. Positioning provided at end of session to maxamize patient outcomes. Patient would benefit from continued skilled OT intervention to address aforementioned deficits.  -ES     Row Name 11/16/21 5733          Therapy Plan Review/Discharge Plan (OT)    Anticipated Discharge Disposition (OT) sub acute care setting  -ES           User Key  (r) = Recorded By, (t) = Taken By, (c) = Cosigned By    Initials Name Provider Type    Emily Sr OT Occupational  Therapist               Outcome Measures     Row Name 11/16/21 1554          How much help from another is currently needed...    Putting on and taking off regular lower body clothing? 2  -ES     Bathing (including washing, rinsing, and drying) 2  -ES     Toileting (which includes using toilet bed pan or urinal) 3  -ES     Putting on and taking off regular upper body clothing 2  -ES     Taking care of personal grooming (such as brushing teeth) 2  -ES     Eating meals 4  -ES     AM-PAC 6 Clicks Score (OT) 15  -ES     Row Name 11/16/21 1300          How much help from another person do you currently need...    Turning from your back to your side while in flat bed without using bedrails? 3  -ZARIA     Moving from lying on back to sitting on the side of a flat bed without bedrails? 2  -ZARIA     Moving to and from a bed to a chair (including a wheelchair)? 1  -ZARIA     Standing up from a chair using your arms (e.g., wheelchair, bedside chair)? 1  -ZARIA     Climbing 3-5 steps with a railing? 1  -ZARIA     To walk in hospital room? 1  -ZARIA     AM-PAC 6 Clicks Score (PT) 9  -ZARIA     Row Name 11/16/21 1554 11/16/21 1300       Functional Assessment    Outcome Measure Options AM-PAC 6 Clicks Daily Activity (OT); Optimal Instrument  -ES AM-PAC 6 Clicks Basic Mobility (PT)  -ZARIA    Row Name 11/16/21 1554          Optimal Instrument    Optimal Instrument Optimal - 3  -ES     Bending/Stooping 5  -ES     Standing 5  -ES     Reaching 3  -ES           User Key  (r) = Recorded By, (t) = Taken By, (c) = Cosigned By    Initials Name Provider Type    Roberto Mercado, PT Physical Therapist    Emily Sr OT Occupational Therapist                Occupational Therapy Education                 Title: PT OT SLP Therapies (In Progress)     Topic: Occupational Therapy (Done)     Point: ADL training (Done)     Description:   Instruct learner(s) on proper safety adaptation and remediation techniques during self care or transfers.   Instruct in proper  use of assistive devices.              Learning Progress Summary           Patient Acceptance, E, VU by AV at 11/12/2021 1349                   Point: Home exercise program (Done)     Description:   Instruct learner(s) on appropriate technique for monitoring, assisting and/or progressing therapeutic exercises/activities.              Learning Progress Summary           Patient Acceptance, E, VU by AV at 11/12/2021 1349                   Point: Precautions (Done)     Description:   Instruct learner(s) on prescribed precautions during self-care and functional transfers.              Learning Progress Summary           Patient Acceptance, E, VU by AV at 11/12/2021 1349                   Point: Body mechanics (Done)     Description:   Instruct learner(s) on proper positioning and spine alignment during self-care, functional mobility activities and/or exercises.              Learning Progress Summary           Patient Acceptance, E, VU by AV at 11/12/2021 1349                               User Key     Initials Effective Dates Name Provider Type Discipline     06/16/21 -  Mike Bolden OT Occupational Therapist OT              OT Recommendation and Plan     Plan of Care Review  Plan of Care Reviewed With: patient  Progress: no change  Outcome Summary: Patient with fair participation in therapy session. Patient declines EOB participation, agreeable to bed level only. Patient completes toileting task this session, min A required for buttox pericare, patient able to complete front pericare provided s/u. Patient demos increased use of UEs with ADL completion. Patient min A to roll L and R with toileting, cues provided for hand placement with rolling. Positioning provided at end of session to maxamize patient outcomes. Patient would benefit from continued skilled OT intervention to address aforementioned deficits.     Time Calculation:    Time Calculation- OT     Row Name 11/16/21 1555             Time Calculation- OT     OT Received On 11/16/21  -ES      OT Goal Re-Cert Due Date 11/21/21  -ES              Timed Charges    21006 - OT Self Care/Mgmt Minutes 10  -ES              Total Minutes    Timed Charges Total Minutes 10  -ES       Total Minutes 10  -ES            User Key  (r) = Recorded By, (t) = Taken By, (c) = Cosigned By    Initials Name Provider Type    ES Emily Cheng OT Occupational Therapist              Therapy Charges for Today     Code Description Service Date Service Provider Modifiers Qty    [id number] HC OT SELF CARE/MGMT/TRAIN EA 15 MIN 11/16/2021 Emily Cheng OT GO 1               Emily Cheng OT  11/16/2021

## 2021-11-17 NOTE — PLAN OF CARE
Goal Outcome Evaluation:         Patient complains of pain in her coccyx, bilateral hips, and bilateral lower extremities. Pain is being managed with p.o. pain medication and repositioning. Patient slept in between care.

## 2021-11-17 NOTE — THERAPY TREATMENT NOTE
Acute Care - Physical Therapy Treatment Note   [facility]     Patient Name: Wilber Marcelino  : 1965  MRN: [id number]  Today's Date: 2021      Visit Dx:   Admit date: 11/10/2021     Referring Physician: Ean Morgan MD     Surgery Date:11/10/2021 - 11/15/2021   Procedure(s) (LRB):  COLONOSCOPY WITH BIOPSY (N/A)         ICD-10-CM ICD-9-CM   1. Rectal bleeding  K62.5 569.3   2. Difficulty walking  R26.2 719.7   3. Decreased activities of daily living (ADL)  Z78.9 V49.89     Patient Active Problem List   Diagnosis   • Rectal bleeding     Past Medical History:   Diagnosis Date   • Fatty liver    • Hypertension    • Kidney stone      Past Surgical History:   Procedure Laterality Date   • CHOLECYSTECTOMY     • COLONOSCOPY N/A 11/15/2021    Procedure: COLONOSCOPY WITH BIOPSY;  Surgeon: Liliam Head MD;  Location: [facility] ENDOSCOPY;  Service: Gastroenterology;  Laterality: N/A;  RECTUM ULCER   • FINGER SURGERY     • HERNIA REPAIR       PT Assessment (last 12 hours)     PT Evaluation and Treatment     Row Name 21 1000          Physical Therapy Time and Intention    Subjective Information no complaints  -DP     Document Type therapy note (daily note)  -DP     Patient Effort good  -DP     Row Name 21 1000          Bed Mobility    Comment (Bed Mobility) Pt declined to perform any bed mobility or transfers due to being nauseated.  -DP     Row Name 21 1000          Motor Skills    Motor Skills therapeutic exercise  -DP     Therapeutic Exercise hip; knee; ankle  -DP     Row Name 21 1000          Hip (Therapeutic Exercise)    Hip (Therapeutic Exercise) isometric exercises; AAROM (active assistive range of motion)  -DP     Hip AAROM (Therapeutic Exercise) bilateral; flexion; aBduction; aDduction; supine; 2 sets; 10 repetitions  -DP     Hip Isometrics (Therapeutic Exercise) bilateral; gluteal sets; 10 repetitions; 3 second hold  -DP     Row Name 21 1000          Knee (Therapeutic  Exercise)    Knee (Therapeutic Exercise) strengthening exercise; AAROM (active assistive range of motion)  -DP     Knee AAROM (Therapeutic Exercise) bilateral; flexion; extension; 2 sets; 10 repetitions  -DP     Knee Strengthening (Therapeutic Exercise) bilateral; SAQ (short arc quad)  -DP     Row Name 11/17/21 1000          Ankle (Therapeutic Exercise)    Ankle (Therapeutic Exercise) AROM (active range of motion)  -DP     Ankle AROM (Therapeutic Exercise) bilateral; dorsiflexion; plantarflexion; inversion; eversion; 2 sets; 10 repetitions  -DP     Row Name             Wound 11/12/21 1440 Bilateral gluteal Traumatic    Wound - Properties Group Placement Date: 11/12/21  -FL Placement Time: 1440  -FL Present on Hospital Admission: Y  -FL Side: Bilateral  -FL Location: gluteal  -FL Primary Wound Type: Traumatic  -FL     Retired Wound - Properties Group Date first assessed: 11/12/21  -FL Time first assessed: 1440  -FL Present on Hospital Admission: Y  -FL Side: Bilateral  -FL Location: gluteal  -FL Primary Wound Type: Traumatic  -FL     Row Name             Wound 11/12/21 1440 Left lower leg Incision    Wound - Properties Group Placement Date: 11/12/21  -FL Placement Time: 1440  -FL Present on Hospital Admission: Y  -FL Side: Left  -FL Orientation: lower  -FL Location: leg  -FL Primary Wound Type: Incision  -FL     Retired Wound - Properties Group Date first assessed: 11/12/21  -FL Time first assessed: 1440  -FL Present on Hospital Admission: Y  -FL Side: Left  -FL Location: leg  -FL Primary Wound Type: Incision  -FL     Row Name             Wound 11/12/21 Left upper thigh Incision    Wound - Properties Group Placement Date: 11/12/21  -FL Present on Hospital Admission: Y  -FL Side: Left  -FL Orientation: upper  -FL Location: thigh  -FL Primary Wound Type: Incision  -FL     Retired Wound - Properties Group Date first assessed: 11/12/21  -FL Present on Hospital Admission: Y  -FL Side: Left  -FL Location: thigh  -FL  Primary Wound Type: Incision  -FL     Row Name 11/17/21 1000          Progress Summary (PT)    Progress Toward Functional Goals (PT) progress toward functional goals is gradual  -DP     Daily Progress Summary (PT) Pt was able to complete exercises in bed, but declined transfers due to feeling nauseated. Pt would continue to benefit from skilled physical therapy to address the reamining impairments in strength, balance, and mobility.  -DP           User Key  (r) = Recorded By, (t) = Taken By, (c) = Cosigned By    Initials Name Provider Type    FL Eufemia Montaño, RN Registered Nurse    Zane Chadwick, PT Physical Therapist                Physical Therapy Education                 Title: PT OT SLP Therapies (In Progress)     Topic: Physical Therapy (Done)     Point: Mobility training (Done)     Learning Progress Summary           Patient Acceptance, E,TB, VU by AV at 11/12/2021 1406                   Point: Home exercise program (Done)     Learning Progress Summary           Patient Acceptance, E,TB, VU by AV at 11/12/2021 1406                   Point: Body mechanics (Done)     Learning Progress Summary           Patient Acceptance, E,TB, VU by AV at 11/12/2021 1406                   Point: Precautions (Done)     Learning Progress Summary           Patient Acceptance, E,TB, VU by AV at 11/12/2021 1406                               User Key     Initials Effective Dates Name Provider Type [facility] 06/11/21 -  Cheko Sauceda, LORENZO Physical Therapist PT              PT Recommendation and Plan     Progress Summary (PT)  Progress Toward Functional Goals (PT): progress toward functional goals is gradual  Daily Progress Summary (PT): Pt was able to complete exercises in bed, but declined transfers due to feeling nauseated. Pt would continue to benefit from skilled physical therapy to address the reamining impairments in strength, balance, and mobility.   Outcome Measures     Row Name 11/16/21 1300              How much help from another person do you currently need...    Turning from your back to your side while in flat bed without using bedrails? 3  -ZARIA      Moving from lying on back to sitting on the side of a flat bed without bedrails? 2  -ZARIA      Moving to and from a bed to a chair (including a wheelchair)? 1  -ZARIA      Standing up from a chair using your arms (e.g., wheelchair, bedside chair)? 1  -ZARIA      Climbing 3-5 steps with a railing? 1  -ZARIA      To walk in hospital room? 1  -ZARIA      AM-PAC 6 Clicks Score (PT) 9  -ZARIA              Functional Assessment    Outcome Measure Options AM-PAC 6 Clicks Basic Mobility (PT)  -ZARIA            User Key  (r) = Recorded By, (t) = Taken By, (c) = Cosigned By    Initials Name Provider Type    ZARIARoberto Sidhu, PT Physical Therapist                 Time Calculation:    PT Charges     Row Name 11/17/21 1049             Time Calculation    PT Received On 11/17/21  -DP              Timed Charges    26836 - PT Therapeutic Exercise Minutes 18  -DP              Total Minutes    Timed Charges Total Minutes 18  -DP       Total Minutes 18  -DP            User Key  (r) = Recorded By, (t) = Taken By, (c) = Cosigned By    Initials Name Provider Type    DP Zane Petersen, PT Physical Therapist              Therapy Charges for Today     Code Description Service Date Service Provider Modifiers Qty    [id number]  PT THER PROC EA 15 MIN 11/17/2021 Zane Petersen, PT GP 1          PT G-Codes  Outcome Measure Options: AM-PAC 6 Clicks Daily Activity (OT), Optimal Instrument  AM-PAC 6 Clicks Score (PT): 9  AM-PAC 6 Clicks Score (OT): 15    Zane Petersen PT  11/17/2021

## 2021-11-17 NOTE — PROGRESS NOTES
[facility]   Hospitalist Progress Note  Date: 2021  Patient Name: Wilber Marcelino  : 1965  MRN: [id number]  Date of admission: 11/10/2021      Subjective   Subjective     Chief Complaint: Blood in stool    Summary: 56-year-old female past medical history significant for fatty liver disease, hypertension, recent admission for rhabdomyolysis secondary to myositis at [facility] currently on prednisone taper, muscle biopsy results pending, presents from [facility] after developing blood in her stools.  Of note patient was on a steroid taper as well as prophylactic Lovenox and aspirin.  Hemoglobin found to be decreased from 16 on previous discharge to 10.  Patient was admitted for further evaluation and treatment of acute blood loss anemia thought secondary to GI bleed.  Gastroenterology consulted. CT the abdomen negative for any acute intra-abdominal processes, demonstrated atelectasis diffuse heterogeneity of L4 vertebral body which had previously been worked up by neurosurgery and neurology at [facility].  Colonoscopy performed demonstrated full ulcerations of the rectum.  Hemoglobin stabilized.  Pre-CERT pending at [facility] for patient to return.    Interval Followup:   Patient with some complaints as staff was unable to help her readjust appropriately resulting in pain to her coccyx.  Patient was resumed on her home pain medications.  Given dysuria and foul-smelling odor, UA was ordered today shows concern for urinary tract infection.  Starting patient on ceftriaxone.    ROS:  No further issues of hematochezia.  No chest pain no palpitations.  Complains of pain to coccyx following issues with adjusting    Objective   Objective     Vitals:   Temp:  [97.5 °F (36.4 °C)-98.4 °F (36.9 °C)] 97.5 °F (36.4 °C)  Heart Rate:  [] 67  Resp:  [16] 16  BP: (108-123)/(74-91) 123/80  Physical Exam    Constitutional: Awake, alert, no acute distress, lying in bed  comfortably   Eyes: Pupils equal, sclerae anicteric, no conjunctival injection   HENT: NCAT, mucous membranes moist   Neck: Supple, no thyromegaly, no lymphadenopathy, trachea midline    Respiratory: Clear to auscultation bilaterally, nonlabored respirations    Cardiovascular: RRR, no murmurs, rubs, or gallops, palpable pedal pulses bilaterally   Gastrointestinal: Positive bowel sounds, soft, nontender, nondistended   Musculoskeletal: No bilateral ankle edema, no clubbing or cyanosis to extremities   Psychiatric: Appropriate affect, cooperative thought content tangential   Neurologic: Oriented x 3, 2 out of 5 strength symmetric in all extremities, Cranial Nerves grossly intact to confrontation, speech clear   Skin: No rashes     Result Review    Result Review:  I have personally reviewed the results from the time of this admission to 11/17/2021 14:52 EST and agree with these findings:  [x]  Laboratory  [x]  Microbiology  [x]  Radiology  [x]  EKG/Telemetry   []  Cardiology/Vascular   []  Pathology  [x]  Old records discharge summary from recent hospitalization at [facility]  []  Other:    Assessment/Plan   Assessment / Plan     Assessment/Plan:  Acute blood loss anemia secondary to GI bleed due to rectal ulceration  GI bleed due to rectal ulcerations  Rectal ulcerations  Myositis  Rhabdomyolysis  Fatty liver disease  Leukocytosis likely secondary to steroids  Likely benign hemangioma of L4 vertebral body previously worked up  Debility with decreased ADLs  Hypokalemia    Plan:  Patient admitted for further evaluation treatment  Gastroenterology consulted thank you for assistance  Hemoglobin remained stable, no needs for transfusion, continue to monitor  Continue Protonix daily   Continue to hold home aspirin  Continue to hold Lovenox  Follow-up with gastroenterology following discharge for results of biopsies  Continue prednisone taper decreasing by 5 mg every 7 days as prescribed on discharge from [facility] and  monitor CK closely  Continue to trend CK    PT/OT consulted  Will return to SNF once pre-CERT pending at [facility] has been approved  Patient resumed on her home pain medications  Patient on ceftriaxone for urinary tract infection      Discussed plan with RN    DVT prophylaxis:  Medical DVT prophylaxis orders are present.    CODE STATUS:   Code Status (Patient has no pulse and is not breathing): CPR (Attempt to Resuscitate)  Medical Interventions (Patient has pulse or is breathing): Full Support      Electronically signed by Ean Morgan MD, 11/17/21, 2:52 PM EST.      WBC   Date Value Ref Range Status   11/17/2021 9.35 3.40 - 10.80 10*3/mm3 Final     RBC   Date Value Ref Range Status   11/17/2021 3.66 (L) 3.77 - 5.28 10*6/mm3 Final     Hemoglobin   Date Value Ref Range Status   11/17/2021 10.0 (L) 12.0 - 15.9 g/dL Final     Hematocrit   Date Value Ref Range Status   11/17/2021 29.5 (L) 34.0 - 46.6 % Final     MCV   Date Value Ref Range Status   11/17/2021 80.6 79.0 - 97.0 fL Final     MCH   Date Value Ref Range Status   11/17/2021 27.3 26.6 - 33.0 pg Final     MCHC   Date Value Ref Range Status   11/17/2021 33.9 31.5 - 35.7 g/dL Final     RDW   Date Value Ref Range Status   11/17/2021 19.0 (H) 12.3 - 15.4 % Final     RDW-SD   Date Value Ref Range Status   11/17/2021 51.4 37.0 - 54.0 fl Final     MPV   Date Value Ref Range Status   11/17/2021 9.1 6.0 - 12.0 fL Final     Platelets   Date Value Ref Range Status   11/17/2021 453 (H) 140 - 450 10*3/mm3 Final       Lab Results   Component Value Date    GLUCOSE 88 11/17/2021    BUN 6 11/17/2021    CREATININE 0.19 (L) 11/17/2021    EGFRIFNONA >150 11/11/2021    EGFRIFAFRI >150 11/17/2021    BCR 31.6 (H) 11/17/2021    K 3.5 11/17/2021    CO2 27.6 11/17/2021    CALCIUM 8.5 (L) 11/17/2021    ALBUMIN 3.00 (L) 11/15/2021    LABIL2 1.2 07/28/2021     (H) 11/15/2021     (H) 11/15/2021       CT Abdomen Pelvis Without Contrast    Result Date:  11/11/2021  PROCEDURE: CT ABDOMEN PELVIS WO CONTRAST  COMPARISON: [facility], CR, XR CHEST 1 VW, 11/11/2021, 0:38.  INDICATIONS: Rectal bleeding, generalized abdominal pain, leukocytosis  TECHNIQUE: 479 CT images were created without intravenous contrast.  Oral contrast agent was administered for the study  PROTOCOL:   Standard imaging protocol performed    RADIATION:   DLP: 1,068 mGy*cm   Automated exposure control was utilized to minimize radiation dose.  FINDINGS: There is anasarca.  The patient's upper extremities within the scan field of view obscure detail.  No mechanical bowel obstruction is suggested.  No pneumoperitoneum or pneumatosis.  No definite acute colitis or diverticulitis by nonenhanced CT examination.  No acute appendicitis.  The areas of relative narrowing involving the colon are thought to be related to peristaltic artifacts, such as involving the transverse colon and the sigmoid colon and portions of the descending colon.   There is possible hepatomegaly with the maximum craniocaudal dimension of the right lobe of the liver measuring 19.2 cm.  The patient has undergone cholecystectomy.  There is suspected compensatory dilatation of the biliary tree with the common bile duct measuring about 1 cm in maximum diameter.  No choledocholithiasis is seen.  The spleen is small in size.  No acute pancreatitis.  No renal stones or hydronephrosis or obstructive uropathy.  Atherosclerotic change involves the aortoiliac arterial system without aneurysmal dilatation.  There may be a tiny umbilical hernia.  It does not contain bowel.  There is possible minimal nodularity of the posterior aspect of the right adrenal gland, as seen on axial image 24 of series 2, with a CT number of -18 (negative eighteen) Hounsfield units.  In the differential diagnosis would be a benign adenoma.  A partial volume average artifact would also be in the differential diagnosis also, as a definite right adrenal nodule  is not clearly appreciated on the sagittal or coronal two-dimensional (2D) images.  No left adrenal nodule is suggested.   No definite nonenhanced CT evidence of a suspicious uterine or adnexal mass.  There are pelvic phleboliths.  There is mild to moderate distension of the urinary bladder.  No urinary bladder calculi or urinary bladder wall thickening is suggested.  No acute intraperitoneal or retroperitoneal hemorrhage.   There is diffuse heterogeneity of the L4 vertebral body, which is nonspecific.  It may represent an intraosseous hemangioma (or venous malformation), measuring about 3.7 cm in greatest transverse diameter.  In the differential diagnosis would be a primary bone tumor, such is multiple myeloma or plasmacytoma.  Consider further imaging assessment, such as with a Nuclear Medicine (NM) whole-body bone scan or lumbar spine MRI examination if clinically warranted and if not contraindicated.  The finding is thought less likely to represent osteomyelitis or to be a manifestation of infectious spondylodiscitis.  No pathologic fracture is suggested.  There is chronic anterolisthesis suspected at L4-5, estimated at 7 mm.  No such findings are seen elsewhere.  Mild to moderate degenerative changes involve the imaged spine.  There are mild degenerative changes of bilateral sacroiliac joints.  There may be minimal degenerative change the bilateral hip joints.  Pubic osteitis is suspected.   There is atelectasis and/or infiltrate(s) in the lower lobe of the left lung.  Pneumonia cannot be excluded.  Atelectasis alone may account for the finding.  No pneumothorax.  Probably no cardiac enlargement is seen.  A trace amount of pericardial effusion is suggested.  Minimal if any left pleural effusion is seen.  No right pleural effusion.   CONCLUSION:  1. There is anasarca.  2. Atelectasis and/or pneumonia may be present within the lower lobe of the left lung.  3. A tiny left pleural effusion is possible.  4. There  is diffuse heterogeneity involving the L4 vertebral body density, which may be related to a benign intraosseous hemangioma.  A malignant process cannot be excluded, such as a plasmacytoma.  No associated pathologic fracture.  Consider Nuclear Medicine (NM) whole-body bone scan and/or lumbar spine MRI examination for further imaging assessment if clinically warranted and if not contraindicated.  5. There is nonspecific distension of the urinary bladder.  No definite urinary bladder wall thickening or urinary bladder calculi.  No hydronephrosis or obstructive uropathy due to a ureteral calculus.  No definite nonobstructing nephrolithiasis.  6. No definite acute colitis or diverticulitis.  No definite pathologic bowel wall thickening.  No mechanical bowel obstruction.  No acute appendicitis.  No pneumoperitoneum or pneumatosis.  7. Otherwise, no acute findings are seen by nonenhanced CT examination.  8. Please see above comments for further detail.   1.   OG STACK JR, MD       Electronically Signed and Approved By: OG STACK JR, MD on 11/11/2021 at 23:21             XR Chest 1 View    Result Date: 11/11/2021  PROCEDURE: XR CHEST 1 VW  COMPARISON: None.  INDICATIONS: SEVERE SEPSIS TRIAGE PROTOCOL.  FINDINGS: A single AP supine portable chest radiograph is provided for review.  There is pulmonary hypoinflation.  Probably no cardiac enlargement.  Hyperdensity is seen within the partially imaged bowel and may represent residual oral contrast agent, especially in the left colon.  The patient has undergone cholecystectomy.  There may be mild subsegmental atelectasis in the lung bases.  Probably no acute infiltrate.  No definite pneumothorax is seen.  A tiny left pleural effusion is possible.  CONCLUSION: Pulmonary hypoinflation is seen.  Probably no acute infiltrate.  A tiny left pleural effusion is possible.  Probably no cardiac enlargement.  Residual oral contrast agent is suggested as discussed.      OG KNIGHT  SREEKANTH DUMONT MD       Electronically Signed and Approved By: OG STACK JR, MD on 11/11/2021 at 1:36

## 2021-11-17 NOTE — PLAN OF CARE
Goal Outcome Evaluation:    RD follow-up:  Pt not tolerating solids.  Since tolerating solids pt agreeable to Boost Plus supplement instead of the Boost Breeze.  Per pt possible discharge back to rehab soon.

## 2021-11-17 NOTE — PLAN OF CARE
Goal Outcome Evaluation:           Progress: no change  Outcome Summary: Patient continues with generalized weakness. Patient with new pain medication today. Patient more comfortable.

## 2021-11-18 NOTE — THERAPY TREATMENT NOTE
Acute Care - Physical Therapy Treatment Note   [facility]     Patient Name: Wilber Marcelino  : 1965  MRN: [id number]  Today's Date: 2021      Visit Dx:   Admit date: 11/10/2021     Referring Physician: Ean Morgan MD     Surgery Date:11/10/2021 - 11/15/2021   Procedure(s) (LRB):  COLONOSCOPY WITH BIOPSY (N/A)         ICD-10-CM ICD-9-CM   1. Rectal bleeding  K62.5 569.3   2. Difficulty walking  R26.2 719.7   3. Decreased activities of daily living (ADL)  Z78.9 V49.89     Patient Active Problem List   Diagnosis   • Rectal bleeding     Past Medical History:   Diagnosis Date   • Fatty liver    • Hypertension    • Kidney stone      Past Surgical History:   Procedure Laterality Date   • CHOLECYSTECTOMY     • COLONOSCOPY N/A 11/15/2021    Procedure: COLONOSCOPY WITH BIOPSY;  Surgeon: Liliam Head MD;  Location: [facility] ENDOSCOPY;  Service: Gastroenterology;  Laterality: N/A;  RECTUM ULCER   • FINGER SURGERY     • HERNIA REPAIR       PT Assessment (last 12 hours)     PT Evaluation and Treatment     Row Name 21 1400          Physical Therapy Time and Intention    Subjective Information no complaints  -DP     Document Type therapy note (daily note)  -DP     Mode of Treatment individual therapy; physical therapy  -DP     Patient Effort good  -DP     Row Name 21 1400          Bed Mobility    Comment (Bed Mobility) Pt declined any bed mobility or transfers due to having been just positioned by wound care.  -DP     Row Name 21 1400          Motor Skills    Motor Skills therapeutic exercise  -DP     Therapeutic Exercise hip; knee; ankle  -DP     Row Name 21 1400          Hip (Therapeutic Exercise)    Hip (Therapeutic Exercise) AAROM (active assistive range of motion)  -DP     Hip AAROM (Therapeutic Exercise) bilateral; aBduction; aDduction; flexion; supine; 2 sets; 10 repetitions  -DP     Hip Isometrics (Therapeutic Exercise) bilateral; gluteal sets; 10 repetitions; 3 second hold  -DP      Row Name 11/18/21 1400          Knee (Therapeutic Exercise)    Knee (Therapeutic Exercise) strengthening exercise  -DP     Knee Strengthening (Therapeutic Exercise) bilateral; heel slides; SAQ (short arc quad); SLR (straight leg raise); supine; 2 sets; 10 repetitions  SLR R only  -DP     Row Name 11/18/21 1400          Ankle (Therapeutic Exercise)    Ankle (Therapeutic Exercise) AROM (active range of motion)  -DP     Ankle AROM (Therapeutic Exercise) bilateral; dorsiflexion; plantarflexion; 2 sets; 10 repetitions  -DP     Row Name             Wound 11/12/21 1440 Bilateral gluteal Traumatic    Wound - Properties Group Placement Date: 11/12/21  -FL Placement Time: 1440  -FL Present on Hospital Admission: Y  -FL Side: Bilateral  -FL Location: gluteal  -FL Primary Wound Type: Traumatic  -FL     Retired Wound - Properties Group Date first assessed: 11/12/21  -FL Time first assessed: 1440  -FL Present on Hospital Admission: Y  -FL Side: Bilateral  -FL Location: gluteal  -FL Primary Wound Type: Traumatic  -FL     Row Name             Wound 11/12/21 1440 Left lower leg Incision    Wound - Properties Group Placement Date: 11/12/21  -FL Placement Time: 1440  -FL Present on Hospital Admission: Y  -FL Side: Left  -FL Orientation: lower  -FL Location: leg  -FL Primary Wound Type: Incision  -FL     Retired Wound - Properties Group Date first assessed: 11/12/21  -FL Time first assessed: 1440  -FL Present on Hospital Admission: Y  -FL Side: Left  -FL Location: leg  -FL Primary Wound Type: Incision  -FL     Row Name             Wound 11/12/21 Left upper thigh Incision    Wound - Properties Group Placement Date: 11/12/21  -FL Present on Hospital Admission: Y  -FL Side: Left  -FL Orientation: upper  -FL Location: thigh  -FL Primary Wound Type: Incision  -FL     Retired Wound - Properties Group Date first assessed: 11/12/21  -FL Present on Hospital Admission: Y  -FL Side: Left  -FL Location: thigh  -FL Primary Wound Type: Incision   -FL     Row Name 11/18/21 1400          Progress Summary (PT)    Progress Toward Functional Goals (PT) progress toward functional goals is gradual  -DP     Daily Progress Summary (PT) Pt was able to complete exercises in bed with decreased assistance from therapist. Pt declined transfers due to just being positioned in bed. Pt would continue to benefit from skilled physical therapy to address the remaining impairments.  -DP           User Key  (r) = Recorded By, (t) = Taken By, (c) = Cosigned By    Initials Name Provider Type    FL Eufemia Montaño, RN Registered Nurse    Zane Chadwick, PT Physical Therapist                Physical Therapy Education                 Title: PT OT SLP Therapies (In Progress)     Topic: Physical Therapy (Done)     Point: Mobility training (Done)     Learning Progress Summary           Patient Acceptance, E,TB, VU by  at 11/12/2021 1406                   Point: Home exercise program (Done)     Learning Progress Summary           Patient Acceptance, E,TB, VU by AV at 11/12/2021 1406                   Point: Body mechanics (Done)     Learning Progress Summary           Patient Acceptance, E,TB, VU by AV at 11/12/2021 1406                   Point: Precautions (Done)     Learning Progress Summary           Patient Acceptance, E,TB, VU by AV at 11/12/2021 1406                               User Key     Initials Effective Dates Name Provider Type [facility] 06/11/21 -  Cheko Sauceda, PT Physical Therapist PT              PT Recommendation and Plan     Progress Summary (PT)  Progress Toward Functional Goals (PT): progress toward functional goals is gradual  Daily Progress Summary (PT): Pt was able to complete exercises in bed with decreased assistance from therapist. Pt declined transfers due to just being positioned in bed. Pt would continue to benefit from skilled physical therapy to address the remaining impairments.   Outcome Measures     Row Name 11/16/21 1300              How much help from another person do you currently need...    Turning from your back to your side while in flat bed without using bedrails? 3  -ZARIA      Moving from lying on back to sitting on the side of a flat bed without bedrails? 2  -ZARIA      Moving to and from a bed to a chair (including a wheelchair)? 1  -ZARIA      Standing up from a chair using your arms (e.g., wheelchair, bedside chair)? 1  -ZARIA      Climbing 3-5 steps with a railing? 1  -ZARIA      To walk in hospital room? 1  -ZARIA      AM-PAC 6 Clicks Score (PT) 9  -ZARIA              Functional Assessment    Outcome Measure Options AM-PAC 6 Clicks Basic Mobility (PT)  -ZARIA            User Key  (r) = Recorded By, (t) = Taken By, (c) = Cosigned By    Initials Name Provider Type    Roberto Mercado, PT Physical Therapist                 Time Calculation:    PT Charges     Row Name 11/18/21 1451             Time Calculation    PT Received On 11/18/21  -DP              Timed Charges    29502 - PT Therapeutic Exercise Minutes 17  -DP              Total Minutes    Timed Charges Total Minutes 17  -DP       Total Minutes 17  -DP            User Key  (r) = Recorded By, (t) = Taken By, (c) = Cosigned By    Initials Name Provider Type    Zane Chadwick, PT Physical Therapist              Therapy Charges for Today     Code Description Service Date Service Provider Modifiers Qty    [id number]  PT THER PROC EA 15 MIN 11/17/2021 Zane Petersen, PT GP 1    [id number] HC PT THER PROC EA 15 MIN 11/18/2021 Zane Petersen, PT GP 1          PT G-Codes  Outcome Measure Options: AM-PAC 6 Clicks Daily Activity (OT), Optimal Instrument  AM-PAC 6 Clicks Score (PT): 9  AM-PAC 6 Clicks Score (OT): 15    Zane Petersen PT  11/18/2021

## 2021-11-18 NOTE — NURSING NOTE
CWON Note:  F/U for POA wound mgmt.  Hx of Fatty Liver, HTN, Kidney stones. Patient presents with traumatic superficial injuries to bilateral gluteal aspects. Friction and shearing with tearing of outermost tissue layer. No discoloration to periwound tissue. Tissue blanchable and normal for skin tone. Dry skin noted. Wound is improving and shallow with minimal drainage. Recommend modifying dressing order. Apply generous amounts of calmoseptine to coccyx TID.     Marilynn ROSENTHAL, FNP-C, CWON

## 2021-11-18 NOTE — PROGRESS NOTES
[facility]   Hospitalist Progress Note  Date: 2021  Patient Name: Wilber Marcelino  : 1965  MRN: [id number]  Date of admission: 11/10/2021      Subjective   Subjective     Chief Complaint: Blood in stool    Summary: 56-year-old female past medical history significant for fatty liver disease, hypertension, recent admission for rhabdomyolysis secondary to myositis at [facility] currently on prednisone taper, muscle biopsy results pending, presents from [facility] after developing blood in her stools.  Of note patient was on a steroid taper as well as prophylactic Lovenox and aspirin.  Hemoglobin found to be decreased from 16 on previous discharge to 10.  Patient was admitted for further evaluation and treatment of acute blood loss anemia thought secondary to GI bleed.  Gastroenterology consulted. CT the abdomen negative for any acute intra-abdominal processes, demonstrated atelectasis diffuse heterogeneity of L4 vertebral body which had previously been worked up by neurosurgery and neurology at [facility].  Colonoscopy performed demonstrated full ulcerations of the rectum.  Hemoglobin stabilized.  Pre-CERT pending at [facility] for patient to return.    Interval Followup:   No acute issues overnight.  Patient's pre-CERT to [facility] has been accepted, however patient requiring a Covid test.  Covid test was ordered as soon as this was found out.  However test results still are not back yet today.  Patient will be discharged back to her care facility tomorrow.    ROS:  No further issues of hematochezia.  No chest pain no palpitations.  Complains of pain to coccyx following issues with adjusting    Objective   Objective     Vitals:   Temp:  [97.3 °F (36.3 °C)-98.7 °F (37.1 °C)] 98.7 °F (37.1 °C)  Heart Rate:  [] 107  Resp:  [16-18] 16  BP: (104-127)/(65-90) 110/83  Physical Exam    Constitutional: Awake, alert, no acute distress, lying in bed  comfortably   Eyes: Pupils equal, sclerae anicteric, no conjunctival injection   HENT: NCAT, mucous membranes moist   Neck: Supple, no thyromegaly, no lymphadenopathy, trachea midline    Respiratory: Clear to auscultation bilaterally, nonlabored respirations    Cardiovascular: RRR, no murmurs, rubs, or gallops, palpable pedal pulses bilaterally   Gastrointestinal: Positive bowel sounds, soft, nontender, nondistended   Musculoskeletal: No bilateral ankle edema, no clubbing or cyanosis to extremities   Psychiatric: Appropriate affect, cooperative thought content tangential   Neurologic: Oriented x 3, 2 out of 5 strength symmetric in all extremities, Cranial Nerves grossly intact to confrontation, speech clear   Skin: No rashes     Result Review    Result Review:  I have personally reviewed the results from the time of this admission to 11/18/2021 16:18 EST and agree with these findings:  [x]  Laboratory  [x]  Microbiology  [x]  Radiology  [x]  EKG/Telemetry   []  Cardiology/Vascular   []  Pathology  [x]  Old records discharge summary from recent hospitalization at [facility]  []  Other:    Assessment/Plan   Assessment / Plan     Assessment/Plan:  Acute blood loss anemia secondary to GI bleed due to rectal ulceration  GI bleed due to rectal ulcerations  Rectal ulcerations  Myositis  Rhabdomyolysis  Fatty liver disease  Leukocytosis likely secondary to steroids  Likely benign hemangioma of L4 vertebral body previously worked up  Debility with decreased ADLs  Hypokalemia    Plan:  Patient admitted for further evaluation treatment  Gastroenterology consulted thank you for assistance  Hemoglobin remained stable, no needs for transfusion, continue to monitor  Continue Protonix daily   Continue to hold home aspirin  Continue to hold Lovenox  Follow-up with gastroenterology following discharge for results of biopsies  Continue prednisone taper decreasing by 5 mg every 7 days as prescribed on discharge from [place] and  monitor CK closely  Continue to trend CK    PT/OT consulted  Patient resumed on her home pain medications  Patient on ceftriaxone for urinary tract infection   No change to current course  Patient's pre-CERT is been approved, Covid test has been ordered per their requirements  Once this returns negative, patient will be able to return to [place], expect this to occur tomorrow.      Discussed plan with RN, case management     DVT prophylaxis:  Medical DVT prophylaxis orders are present.    CODE STATUS:   Code Status (Patient has no pulse and is not breathing): CPR (Attempt to Resuscitate)  Medical Interventions (Patient has pulse or is breathing): Full Support      Electronically signed by Ean Morgan MD, 11/18/21, 4:18 PM EST.      WBC   Date Value Ref Range Status   11/18/2021 9.88 3.40 - 10.80 10*3/mm3 Final     RBC   Date Value Ref Range Status   11/18/2021 3.88 3.77 - 5.28 10*6/mm3 Final     Hemoglobin   Date Value Ref Range Status   11/18/2021 10.7 (L) 12.0 - 15.9 g/dL Final     Hematocrit   Date Value Ref Range Status   11/18/2021 32.2 (L) 34.0 - 46.6 % Final     MCV   Date Value Ref Range Status   11/18/2021 83.0 79.0 - 97.0 fL Final     MCH   Date Value Ref Range Status   11/18/2021 27.6 26.6 - 33.0 pg Final     MCHC   Date Value Ref Range Status   11/18/2021 33.2 31.5 - 35.7 g/dL Final     RDW   Date Value Ref Range Status   11/18/2021 19.6 (H) 12.3 - 15.4 % Final     RDW-SD   Date Value Ref Range Status   11/18/2021 54.4 (H) 37.0 - 54.0 fl Final     MPV   Date Value Ref Range Status   11/18/2021 8.6 6.0 - 12.0 fL Final     Platelets   Date Value Ref Range Status   11/18/2021 495 (H) 140 - 450 10*3/mm3 Final       Lab Results   Component Value Date    GLUCOSE 82 11/18/2021    BUN 9 11/18/2021    CREATININE 0.28 (L) 11/18/2021    EGFRIFNONA >150 11/11/2021    EGFRIFAFRI >150 11/18/2021    BCR 32.1 (H) 11/18/2021    K 4.5 11/18/2021    CO2 28.3 11/18/2021    CALCIUM 8.6 11/18/2021    ALBUMIN 3.00 (L)  11/15/2021    LABIL2 1.2 07/28/2021     (H) 11/15/2021     (H) 11/15/2021       CT Abdomen Pelvis Without Contrast    Result Date: 11/11/2021  PROCEDURE: CT ABDOMEN PELVIS WO CONTRAST  COMPARISON: [facility], CR, XR CHEST 1 VW, 11/11/2021, 0:38.  INDICATIONS: Rectal bleeding, generalized abdominal pain, leukocytosis  TECHNIQUE: 479 CT images were created without intravenous contrast.  Oral contrast agent was administered for the study  PROTOCOL:   Standard imaging protocol performed    RADIATION:   DLP: 1,068 mGy*cm   Automated exposure control was utilized to minimize radiation dose.  FINDINGS: There is anasarca.  The patient's upper extremities within the scan field of view obscure detail.  No mechanical bowel obstruction is suggested.  No pneumoperitoneum or pneumatosis.  No definite acute colitis or diverticulitis by nonenhanced CT examination.  No acute appendicitis.  The areas of relative narrowing involving the colon are thought to be related to peristaltic artifacts, such as involving the transverse colon and the sigmoid colon and portions of the descending colon.   There is possible hepatomegaly with the maximum craniocaudal dimension of the right lobe of the liver measuring 19.2 cm.  The patient has undergone cholecystectomy.  There is suspected compensatory dilatation of the biliary tree with the common bile duct measuring about 1 cm in maximum diameter.  No choledocholithiasis is seen.  The spleen is small in size.  No acute pancreatitis.  No renal stones or hydronephrosis or obstructive uropathy.  Atherosclerotic change involves the aortoiliac arterial system without aneurysmal dilatation.  There may be a tiny umbilical hernia.  It does not contain bowel.  There is possible minimal nodularity of the posterior aspect of the right adrenal gland, as seen on axial image 24 of series 2, with a CT number of -18 (negative eighteen) Hounsfield units.  In the differential diagnosis  would be a benign adenoma.  A partial volume average artifact would also be in the differential diagnosis also, as a definite right adrenal nodule is not clearly appreciated on the sagittal or coronal two-dimensional (2D) images.  No left adrenal nodule is suggested.   No definite nonenhanced CT evidence of a suspicious uterine or adnexal mass.  There are pelvic phleboliths.  There is mild to moderate distension of the urinary bladder.  No urinary bladder calculi or urinary bladder wall thickening is suggested.  No acute intraperitoneal or retroperitoneal hemorrhage.   There is diffuse heterogeneity of the L4 vertebral body, which is nonspecific.  It may represent an intraosseous hemangioma (or venous malformation), measuring about 3.7 cm in greatest transverse diameter.  In the differential diagnosis would be a primary bone tumor, such is multiple myeloma or plasmacytoma.  Consider further imaging assessment, such as with a Nuclear Medicine (NM) whole-body bone scan or lumbar spine MRI examination if clinically warranted and if not contraindicated.  The finding is thought less likely to represent osteomyelitis or to be a manifestation of infectious spondylodiscitis.  No pathologic fracture is suggested.  There is chronic anterolisthesis suspected at L4-5, estimated at 7 mm.  No such findings are seen elsewhere.  Mild to moderate degenerative changes involve the imaged spine.  There are mild degenerative changes of bilateral sacroiliac joints.  There may be minimal degenerative change the bilateral hip joints.  Pubic osteitis is suspected.   There is atelectasis and/or infiltrate(s) in the lower lobe of the left lung.  Pneumonia cannot be excluded.  Atelectasis alone may account for the finding.  No pneumothorax.  Probably no cardiac enlargement is seen.  A trace amount of pericardial effusion is suggested.  Minimal if any left pleural effusion is seen.  No right pleural effusion.   CONCLUSION:  1. There is  anasarca.  2. Atelectasis and/or pneumonia may be present within the lower lobe of the left lung.  3. A tiny left pleural effusion is possible.  4. There is diffuse heterogeneity involving the L4 vertebral body density, which may be related to a benign intraosseous hemangioma.  A malignant process cannot be excluded, such as a plasmacytoma.  No associated pathologic fracture.  Consider Nuclear Medicine (NM) whole-body bone scan and/or lumbar spine MRI examination for further imaging assessment if clinically warranted and if not contraindicated.  5. There is nonspecific distension of the urinary bladder.  No definite urinary bladder wall thickening or urinary bladder calculi.  No hydronephrosis or obstructive uropathy due to a ureteral calculus.  No definite nonobstructing nephrolithiasis.  6. No definite acute colitis or diverticulitis.  No definite pathologic bowel wall thickening.  No mechanical bowel obstruction.  No acute appendicitis.  No pneumoperitoneum or pneumatosis.  7. Otherwise, no acute findings are seen by nonenhanced CT examination.  8. Please see above comments for further detail.   1.   OG STACK JR, MD       Electronically Signed and Approved By: OG STACK JR, MD on 11/11/2021 at 23:21             XR Chest 1 View    Result Date: 11/11/2021  PROCEDURE: XR CHEST 1 VW  COMPARISON: None.  INDICATIONS: SEVERE SEPSIS TRIAGE PROTOCOL.  FINDINGS: A single AP supine portable chest radiograph is provided for review.  There is pulmonary hypoinflation.  Probably no cardiac enlargement.  Hyperdensity is seen within the partially imaged bowel and may represent residual oral contrast agent, especially in the left colon.  The patient has undergone cholecystectomy.  There may be mild subsegmental atelectasis in the lung bases.  Probably no acute infiltrate.  No definite pneumothorax is seen.  A tiny left pleural effusion is possible.  CONCLUSION: Pulmonary hypoinflation is seen.  Probably no acute  infiltrate.  A tiny left pleural effusion is possible.  Probably no cardiac enlargement.  Residual oral contrast agent is suggested as discussed.      OG STACK JR, MD       Electronically Signed and Approved By: OG STACK JR, MD on 11/11/2021 at 1:36

## 2021-11-18 NOTE — PLAN OF CARE
Goal Outcome Evaluation:  Plan of Care Reviewed With: patient           Outcome Summary: no changes this shift.

## 2021-11-19 NOTE — PLAN OF CARE
Goal Outcome Evaluation:  Plan of Care Reviewed With: patient        Progress: no change  Outcome Summary: Patient is D/Cing to [facility].

## 2021-11-19 NOTE — PLAN OF CARE
Goal Outcome Evaluation:           Progress: no change  Outcome Summary: No changes this shift. Await placement.

## 2021-11-19 NOTE — TELEPHONE ENCOUNTER
Patient confirmed of results. Patient states she has a gastrologist appt in March with a physician in [place].

## 2021-11-19 NOTE — DISCHARGE SUMMARY
[facility]         HOSPITALIST  DISCHARGE SUMMARY    Patient Name: Wilber Marcelino  : 1965  MRN: [id number]    Date of Admission: 11/10/2021  Date of Discharge:  2021  Primary Care Physician: Colt Hammond Sr., MD    Consults     Date and Time Order Name Status Description    2021 11:03 AM Inpatient Gastroenterology Consult Completed     2021  1:49 AM Hospitalist (on-call MD unless specified)            Active and Resolved Hospital Problems:  Active Hospital Problems    Diagnosis POA   • Rectal bleeding [K62.5] Yes      Resolved Hospital Problems   No resolved problems to display.       Hospital Course     Hospital Course:  Wilber Marcelino is a 56 y.o. female with past medical history significant for fatty liver disease, hypertension, recent admission for rhabdomyolysis secondary to myositis at [facility] currently on prednisone taper, muscle biopsy results pending, presents from [facility] and rehab after developing blood in her stools.  Of note patient was on a steroid taper as well as prophylactic Lovenox and aspirin.  Hemoglobin found to be decreased from 16 on previous discharge to 10.  Patient was admitted for further evaluation and treatment of acute blood loss anemia thought secondary to GI bleed.  Gastroenterology consulted. CT the abdomen negative for any acute intra-abdominal processes, demonstrated atelectasis diffuse heterogeneity of L4 vertebral body which had previously been worked up by neurosurgery and neurology at [place].  Colonoscopy performed demonstrated full ulcerations of the rectum, biopsies performed not returned by time of discharge. Hemoglobin stabilized.  Patient discharged off Lovenox, will continue on full-strength aspirin.  Patient is discharging back to the care facility from which she came, [place].  Patient discharged with a course of antibiotics for urinary tract infection diagnosed inpatient, patient was  treated with ceftriaxone inpatient, discharged with cefdinir.  Allergy to penicillins however tolerated ceftriaxone with no issue..  Patient will continue on the original steroid taper as outlined by [facility] in [place] for myositis.  Patient seen on date of discharge, clinically and hemodynamically stable.  Patient provided concerning signs and symptoms prompting immediate medical attention, patient understanding and agreeable    DISCHARGE Follow Up Recommendations for labs and diagnostics:   Follow-up primary care physician  Follow-up with neurology per discharge in [place]  Follow-up with gastroenterology for biopsy results      Day of Discharge     Vital Signs:  Temp:  [97.5 °F (36.4 °C)-98.7 °F (37.1 °C)] 97.5 °F (36.4 °C)  Heart Rate:  [107-115] 107  Resp:  [16-18] 18  BP: (107-128)/(75-93) 122/86  Physical Exam:                 Constitutional: Awake, alert, no acute distress, lying in bed comfortably              Eyes: Pupils equal, sclerae anicteric, no conjunctival injection              HENT: NCAT, mucous membranes moist              Neck: Supple, no thyromegaly, no lymphadenopathy, trachea midline               Respiratory: Clear to auscultation bilaterally, nonlabored respirations               Cardiovascular: RRR, no murmurs, rubs, or gallops, palpable pedal pulses bilaterally              Gastrointestinal: Positive bowel sounds, soft, nontender, nondistended              Musculoskeletal: No bilateral ankle edema, no clubbing or cyanosis to extremities              Psychiatric: Appropriate affect, cooperative thought content tangential              Neurologic: Oriented x 3, 2 out of 5 strength symmetric in all extremities, Cranial Nerves grossly intact to confrontation, speech clear              Skin: No rashes       Discharge Details        Discharge Medications      New Medications      Instructions Start Date   cefdinir 300 MG capsule  Commonly known as: OMNICEF   300 mg, Oral, 2 Times  Daily         Continue These Medications      Instructions Start Date   acetaminophen 325 MG tablet  Commonly known as: TYLENOL   650 mg, Oral, Every 4 Hours PRN      albuterol (5 MG/ML) 0.5% nebulizer solution  Commonly known as: PROVENTIL   2.5 mg, Nebulization, Every 6 Hours PRN      ALPRAZolam 1 MG tablet  Commonly known as: XANAX   1 mg, Oral, 2 Times Daily PRN      aspirin 325 MG tablet   325 mg, Oral, Daily      bisacodyl 10 MG suppository  Commonly known as: DULCOLAX   10 mg, Rectal, Daily      cyclobenzaprine 10 MG tablet  Commonly known as: FLEXERIL   10 mg, Oral, 3 Times Daily PRN      fleet enema 7-19 GM/118ML enema   1 enema, Rectal, Once      Fluticasone Propionate (Inhal) 100 MCG/BLIST aerosol powder    1 spray, Nasal, Daily PRN      GlycoLax 17 GM/SCOOP powder  Generic drug: polyethylene glycol   17 g, Oral, 2 Times Daily      HYDROcodone-acetaminophen 5-325 MG per tablet  Commonly known as: NORCO   1 tablet, Oral, Every 4 Hours PRN      imipramine 100 MG capsule  Commonly known as: TOFRANIL-PM   100 mg, Oral, Nightly      magnesium hydroxide 400 MG/5ML suspension  Commonly known as: MILK OF MAGNESIA   5 mL, Oral, Daily PRN      mirtazapine 15 MG tablet  Commonly known as: REMERON   7.5 mg, Oral, Nightly      omeprazole 40 MG capsule  Commonly known as: priLOSEC   40 mg, Oral, Daily      predniSONE 20 MG tablet  Commonly known as: DELTASONE   20 mg, Oral, Daily, Tapering doses ordered, started with 60mg on 11/4.  [facility]      TUBERCULIN PPD ID   Intradermal      witch hazel-glycerin pad  Commonly known as: TUCKS   1 pad, Rectal, As Needed         Stop These Medications    enoxaparin 40 MG/0.4ML solution syringe  Commonly known as: LOVENOX            Allergies   Allergen Reactions   • Iodine Unknown - High Severity   • Penicillins Hives       Discharge Disposition:  Skilled Nursing Facility (DC - External)    Diet:  Hospital:  Diet Order   Procedures   • Diet Regular; Cardiac        Discharge Activity:   Activity Instructions     Activity as Tolerated            CODE STATUS:  Code Status and Medical Interventions:   Ordered at: 11/11/21 0210     Code Status (Patient has no pulse and is not breathing):    CPR (Attempt to Resuscitate)     Medical Interventions (Patient has pulse or is breathing):    Full Support         No future appointments.    Additional Instructions for the Follow-ups that You Need to Schedule     Discharge Follow-up with PCP   As directed       Currently Documented PCP:    Colt Hammond Sr., MD    PCP Phone Number:    [phone]     Follow Up Details: In less than one week         Discharge Follow-up with Specified Provider: Dr Head; 2 Weeks   As directed      To: Dr Head    Follow Up: 2 Weeks               Pertinent  and/or Most Recent Results     PROCEDURES:   Colonoscopy    LAB RESULTS:      Lab 11/19/21  0538 11/18/21  0613 11/17/21  0651 11/15/21  0457 11/14/21  0505   WBC 9.41 9.88 9.35 10.03 11.13*   HEMOGLOBIN 10.3* 10.7* 10.0* 10.1* 9.8*   HEMATOCRIT 30.5* 32.2* 29.5* 29.4* 28.8*   PLATELETS 436 495* 453* 340 299   MCV 81.3 83.0 80.6 81.0 80.9         Lab 11/19/21  0538 11/18/21  0613 11/17/21  0651 11/15/21  0457   SODIUM  --  141 139 139   POTASSIUM  --  4.5 3.5 3.0*   CHLORIDE  --  104 103 101   CO2  --  28.3 27.6 27.8   ANION GAP  --  8.7 8.4 10.2   BUN  --  9 6 4*   CREATININE  --  0.28* 0.19* <0.17*   GLUCOSE  --  82 88 82   CALCIUM  --  8.6 8.5* 7.9*   MAGNESIUM 2.1 2.0 2.0 1.8         Lab 11/15/21  0457   TOTAL PROTEIN 4.9*   ALBUMIN 3.00*   GLOBULIN 1.9   ALT (SGPT) 149*   AST (SGOT) 120*   BILIRUBIN 0.4   ALK PHOS 59                     Brief Urine Lab Results  (Last result in the past 365 days)      Color   Clarity   Blood   Leuk Est   Nitrite   Protein   CREAT   Urine HCG        11/17/21 1424 Yellow   Turbid   Negative   Large (3+)   Negative   Trace               Microbiology Results (last 10 days)     Procedure Component Value -  Date/Time    COVID PRE-OP / PRE-PROCEDURE SCREENING ORDER (NO ISOLATION) - Swab, Nasopharynx [[id number]]  (Normal) Collected: 11/18/21 1344    Lab Status: Final result Specimen: Swab from Nasopharynx Updated: 11/18/21 1732    Narrative:      The following orders were created for panel order COVID PRE-OP / PRE-PROCEDURE SCREENING ORDER (NO ISOLATION) - Swab, Nasopharynx.  Procedure                               Abnormality         Status                     ---------                               -----------         ------                     COVID-19,CEPHEID/NANDA/BD...[[id number]]  Normal              Final result                 Please view results for these tests on the individual orders.    COVID-19,CEPHEID/NANDA/BDMAX,COR/PEREZ/PAD/LYUBOV IN-HOUSE(OR EMERGENT/ADD-ON),NP SWAB IN TRANSPORT MEDIA 3-4 HR TAT, RT-PCR - Swab, Nasopharynx [[id number]]  (Normal) Collected: 11/18/21 1344    Lab Status: Final result Specimen: Swab from Nasopharynx Updated: 11/18/21 1732     COVID19 Not Detected    Narrative:      Fact sheet for providers: https://www.fda.gov/media/773502/download     Fact sheet for patients: https://www.fda.gov/media/661780/download  Presumptive Positive: additional testing may be indicated if it is necessary to differentiate between SARS-CoV-2 and other Sarbecovirus, for epidemiological purposes, or clinical management.    Urine Culture - Urine, Urine, Clean Catch [[id number]]  (Abnormal) Collected: 11/17/21 1424    Lab Status: Preliminary result Specimen: Urine, Clean Catch Updated: 11/18/21 1139     Urine Culture >100,000 CFU/mL Gram Negative Bacilli    Blood Culture - Blood, Arm, Left [[id number]]  (Normal) Collected: 11/11/21 0743    Lab Status: Final result Specimen: Blood from Arm, Left Updated: 11/16/21 0802     Blood Culture No growth at 5 days    Blood Culture - Blood, Hand, Left [[id number]]  (Normal) Collected: 11/11/21 0058    Lab Status: Final result Specimen: Blood from Hand, Left Updated: 11/16/21  0116     Blood Culture No growth at 5 days                           Labs Pending at Discharge:  Pending Labs     Order Current Status    Urinalysis With Culture If Indicated - Urine, Clean Catch Collected (11/17/21 1423)    Basic Metabolic Panel In process    Urine Culture - Urine, Urine, Clean Catch Preliminary result            Time spent on Discharge including face to face service:  39 minutes    Electronically signed by Ean Morgan MD, 11/19/21, 8:28 AM EST.

## 2021-12-01 ENCOUNTER — INPATIENT HOSPITAL (OUTPATIENT)
Dept: URBAN - METROPOLITAN AREA HOSPITAL 107 | Facility: HOSPITAL | Age: 56
End: 2021-12-01
Payer: COMMERCIAL

## 2021-12-01 DIAGNOSIS — R74.01 ELEVATION OF LEVELS OF LIVER TRANSAMINASE LEVELS: ICD-10-CM

## 2021-12-01 DIAGNOSIS — D62 ACUTE POSTHEMORRHAGIC ANEMIA: ICD-10-CM

## 2021-12-01 DIAGNOSIS — K62.5 HEMORRHAGE OF ANUS AND RECTUM: ICD-10-CM

## 2021-12-01 PROCEDURE — 99223 1ST HOSP IP/OBS HIGH 75: CPT | Performed by: PHYSICIAN ASSISTANT

## 2021-12-02 ENCOUNTER — INPATIENT HOSPITAL (OUTPATIENT)
Dept: URBAN - METROPOLITAN AREA HOSPITAL 107 | Facility: HOSPITAL | Age: 56
End: 2021-12-02
Payer: COMMERCIAL

## 2021-12-02 DIAGNOSIS — R74.8 ABNORMAL LEVELS OF OTHER SERUM ENZYMES: ICD-10-CM

## 2021-12-02 DIAGNOSIS — K62.6 ULCER OF ANUS AND RECTUM: ICD-10-CM

## 2021-12-02 DIAGNOSIS — K92.1 MELENA: ICD-10-CM

## 2021-12-02 PROCEDURE — 99232 SBSQ HOSP IP/OBS MODERATE 35: CPT | Performed by: PHYSICIAN ASSISTANT

## 2021-12-04 ENCOUNTER — INPATIENT HOSPITAL (OUTPATIENT)
Dept: URBAN - METROPOLITAN AREA HOSPITAL 107 | Facility: HOSPITAL | Age: 56
End: 2021-12-04
Payer: COMMERCIAL

## 2021-12-04 DIAGNOSIS — K62.5 HEMORRHAGE OF ANUS AND RECTUM: ICD-10-CM

## 2021-12-04 DIAGNOSIS — K92.1 MELENA: ICD-10-CM

## 2021-12-04 DIAGNOSIS — K62.6 ULCER OF ANUS AND RECTUM: ICD-10-CM

## 2021-12-04 PROCEDURE — 45334 SIGMOIDOSCOPY FOR BLEEDING: CPT | Performed by: INTERNAL MEDICINE

## 2021-12-05 ENCOUNTER — INPATIENT HOSPITAL (OUTPATIENT)
Dept: URBAN - METROPOLITAN AREA HOSPITAL 107 | Facility: HOSPITAL | Age: 56
End: 2021-12-05
Payer: COMMERCIAL

## 2021-12-05 DIAGNOSIS — K92.2 GASTROINTESTINAL HEMORRHAGE, UNSPECIFIED: ICD-10-CM

## 2021-12-05 DIAGNOSIS — K62.6 ULCER OF ANUS AND RECTUM: ICD-10-CM

## 2021-12-05 DIAGNOSIS — K92.1 MELENA: ICD-10-CM

## 2021-12-05 PROCEDURE — 99232 SBSQ HOSP IP/OBS MODERATE 35: CPT | Performed by: INTERNAL MEDICINE

## 2021-12-06 ENCOUNTER — INPATIENT HOSPITAL (OUTPATIENT)
Dept: URBAN - METROPOLITAN AREA HOSPITAL 107 | Facility: HOSPITAL | Age: 56
End: 2021-12-06
Payer: COMMERCIAL

## 2021-12-06 DIAGNOSIS — K62.6 ULCER OF ANUS AND RECTUM: ICD-10-CM

## 2021-12-06 DIAGNOSIS — K92.2 GASTROINTESTINAL HEMORRHAGE, UNSPECIFIED: ICD-10-CM

## 2021-12-06 DIAGNOSIS — K92.1 MELENA: ICD-10-CM

## 2021-12-06 PROCEDURE — 99231 SBSQ HOSP IP/OBS SF/LOW 25: CPT | Performed by: PHYSICIAN ASSISTANT

## 2022-01-01 ENCOUNTER — APPOINTMENT (OUTPATIENT)
Dept: CARDIOLOGY | Facility: HOSPITAL | Age: 57
End: 2022-01-01

## 2022-01-01 ENCOUNTER — APPOINTMENT (OUTPATIENT)
Dept: GENERAL RADIOLOGY | Facility: HOSPITAL | Age: 57
End: 2022-01-01

## 2022-01-01 ENCOUNTER — APPOINTMENT (OUTPATIENT)
Dept: CT IMAGING | Facility: HOSPITAL | Age: 57
End: 2022-01-01

## 2022-01-01 ENCOUNTER — HOSPITAL ENCOUNTER (INPATIENT)
Facility: HOSPITAL | Age: 57
LOS: 4 days | End: 2022-07-24
Attending: EMERGENCY MEDICINE | Admitting: INTERNAL MEDICINE

## 2022-01-01 VITALS
RESPIRATION RATE: 17 BRPM | HEIGHT: 64 IN | BODY MASS INDEX: 21.53 KG/M2 | HEART RATE: 135 BPM | SYSTOLIC BLOOD PRESSURE: 130 MMHG | DIASTOLIC BLOOD PRESSURE: 85 MMHG | OXYGEN SATURATION: 95 % | TEMPERATURE: 101.48 F | WEIGHT: 126.1 LBS

## 2022-01-01 DIAGNOSIS — I46.9 CARDIAC ARREST: Primary | ICD-10-CM

## 2022-01-01 DIAGNOSIS — I95.9 HYPOTENSION, UNSPECIFIED HYPOTENSION TYPE: ICD-10-CM

## 2022-01-01 LAB
ABO GROUP BLD: NORMAL
ALBUMIN SERPL-MCNC: 2.9 G/DL (ref 3.5–5.2)
ALBUMIN SERPL-MCNC: 3.2 G/DL (ref 3.5–5.2)
ALBUMIN SERPL-MCNC: 3.2 G/DL (ref 3.5–5.2)
ALBUMIN SERPL-MCNC: 3.6 G/DL (ref 3.5–5.2)
ALBUMIN SERPL-MCNC: 3.7 G/DL (ref 3.5–5.2)
ALBUMIN SERPL-MCNC: 4.2 G/DL (ref 3.5–5.2)
ALBUMIN/GLOB SERPL: 1.3 G/DL
ALBUMIN/GLOB SERPL: 1.5 G/DL
ALBUMIN/GLOB SERPL: 1.6 G/DL
ALBUMIN/GLOB SERPL: 1.7 G/DL
ALBUMIN/GLOB SERPL: 1.9 G/DL
ALBUMIN/GLOB SERPL: 2.5 G/DL
ALP SERPL-CCNC: 113 U/L (ref 39–117)
ALP SERPL-CCNC: 117 U/L (ref 39–117)
ALP SERPL-CCNC: 153 U/L (ref 39–117)
ALP SERPL-CCNC: 83 U/L (ref 39–117)
ALP SERPL-CCNC: 86 U/L (ref 39–117)
ALP SERPL-CCNC: 87 U/L (ref 39–117)
ALT SERPL W P-5'-P-CCNC: 1180 U/L (ref 1–33)
ALT SERPL W P-5'-P-CCNC: 1587 U/L (ref 1–33)
ALT SERPL W P-5'-P-CCNC: 386 U/L (ref 1–33)
ALT SERPL W P-5'-P-CCNC: 484 U/L (ref 1–33)
ALT SERPL W P-5'-P-CCNC: 649 U/L (ref 1–33)
ALT SERPL W P-5'-P-CCNC: 939 U/L (ref 1–33)
ANION GAP SERPL CALCULATED.3IONS-SCNC: 11 MMOL/L (ref 5–15)
ANION GAP SERPL CALCULATED.3IONS-SCNC: 13 MMOL/L (ref 10–20)
ANION GAP SERPL CALCULATED.3IONS-SCNC: 13 MMOL/L (ref 5–15)
ANION GAP SERPL CALCULATED.3IONS-SCNC: 15 MMOL/L (ref 5–15)
ANION GAP SERPL CALCULATED.3IONS-SCNC: 17 MMOL/L (ref 5–15)
ANISOCYTOSIS BLD QL: ABNORMAL
ANISOCYTOSIS BLD QL: NORMAL
ANISOCYTOSIS BLD QL: NORMAL
APTT PPP: 40.1 SECONDS (ref 24–31)
ARTERIAL PATENCY WRIST A: ABNORMAL
ARTERIAL PATENCY WRIST A: POSITIVE
AST SERPL-CCNC: 1077 U/L (ref 1–32)
AST SERPL-CCNC: 1416 U/L (ref 1–32)
AST SERPL-CCNC: 1817 U/L (ref 1–32)
AST SERPL-CCNC: 205 U/L (ref 1–32)
AST SERPL-CCNC: 271 U/L (ref 1–32)
AST SERPL-CCNC: 412 U/L (ref 1–32)
ATMOSPHERIC PRESS: ABNORMAL MM[HG]
BACTERIA BLD CULT: ABNORMAL
BACTERIA SPEC AEROBE CULT: ABNORMAL
BACTERIA SPEC AEROBE CULT: ABNORMAL
BACTERIA SPEC AEROBE CULT: NORMAL
BACTERIA SPEC RESP CULT: ABNORMAL
BACTERIA SPEC RESP CULT: ABNORMAL
BACTERIA UR QL AUTO: ABNORMAL /HPF
BACTERIA UR QL AUTO: ABNORMAL /HPF
BASE EXCESS BLDA CALC-SCNC: 0.9 MMOL/L (ref 0–3)
BASE EXCESS BLDA CALC-SCNC: 1.1 MMOL/L (ref 0–3)
BASE EXCESS BLDA CALC-SCNC: 6.1 MMOL/L (ref 0–3)
BASE EXCESS BLDA CALC-SCNC: 8.3 MMOL/L (ref 0–3)
BASE EXCESS BLDA CALC-SCNC: 8.4 MMOL/L (ref 0–3)
BASOPHILS # BLD AUTO: 0 10*3/MM3 (ref 0–0.2)
BASOPHILS # BLD AUTO: 0 10*3/MM3 (ref 0–0.2)
BASOPHILS # BLD AUTO: 0.1 10*3/MM3 (ref 0–0.2)
BASOPHILS NFR BLD AUTO: 0.2 % (ref 0–1.5)
BASOPHILS NFR BLD AUTO: 0.2 % (ref 0–1.5)
BASOPHILS NFR BLD AUTO: 0.6 % (ref 0–1.5)
BDY SITE: ABNORMAL
BH BB BLOOD EXPIRATION DATE: NORMAL
BH BB BLOOD EXPIRATION DATE: NORMAL
BH BB BLOOD TYPE BARCODE: 9500
BH BB BLOOD TYPE BARCODE: 9500
BH BB DISPENSE STATUS: NORMAL
BH BB DISPENSE STATUS: NORMAL
BH BB PRODUCT CODE: NORMAL
BH BB PRODUCT CODE: NORMAL
BH BB UNIT NUMBER: NORMAL
BH BB UNIT NUMBER: NORMAL
BH CV ECHO MEAS - ACS: 1.87 CM
BH CV ECHO MEAS - AO MAX PG: 4.6 MMHG
BH CV ECHO MEAS - AO MEAN PG: 2.6 MMHG
BH CV ECHO MEAS - AO ROOT DIAM: 3.1 CM
BH CV ECHO MEAS - AO V2 MAX: 107.2 CM/SEC
BH CV ECHO MEAS - AO V2 VTI: 15.1 CM
BH CV ECHO MEAS - AVA(I,D): 2.6 CM2
BH CV ECHO MEAS - EDV(CUBED): 67 ML
BH CV ECHO MEAS - EDV(MOD-SP4): 59.2 ML
BH CV ECHO MEAS - EF(MOD-SP4): 49.4 %
BH CV ECHO MEAS - ESV(MOD-SP4): 30 ML
BH CV ECHO MEAS - IVS/LVPW: 0.85 CM
BH CV ECHO MEAS - IVSD: 0.89 CM
BH CV ECHO MEAS - LA DIMENSION: 3.1 CM
BH CV ECHO MEAS - LV MASS(C)D: 123.1 GRAMS
BH CV ECHO MEAS - LV MAX PG: 2.6 MMHG
BH CV ECHO MEAS - LV MEAN PG: 1.35 MMHG
BH CV ECHO MEAS - LV V1 MAX: 80.6 CM/SEC
BH CV ECHO MEAS - LV V1 VTI: 12 CM
BH CV ECHO MEAS - LVIDD: 4.1 CM
BH CV ECHO MEAS - LVOT AREA: 3.3 CM2
BH CV ECHO MEAS - LVOT DIAM: 2.05 CM
BH CV ECHO MEAS - LVPWD: 1.04 CM
BH CV ECHO MEAS - MR MAX PG: 65.7 MMHG
BH CV ECHO MEAS - MR MAX VEL: 405.4 CM/SEC
BH CV ECHO MEAS - MV A MAX VEL: 53.5 CM/SEC
BH CV ECHO MEAS - MV DEC SLOPE: 428.7 CM/SEC2
BH CV ECHO MEAS - MV DEC TIME: 0.13 MSEC
BH CV ECHO MEAS - MV E MAX VEL: 55.7 CM/SEC
BH CV ECHO MEAS - MV E/A: 1.04
BH CV ECHO MEAS - MV MAX PG: 2.27 MMHG
BH CV ECHO MEAS - MV MEAN PG: 1.04 MMHG
BH CV ECHO MEAS - MV V2 VTI: 16.1 CM
BH CV ECHO MEAS - MVA(VTI): 2.47 CM2
BH CV ECHO MEAS - PA V2 MAX: 83.4 CM/SEC
BH CV ECHO MEAS - RV MAX PG: 1.08 MMHG
BH CV ECHO MEAS - RV V1 MAX: 52 CM/SEC
BH CV ECHO MEAS - RV V1 VTI: 8.2 CM
BH CV ECHO MEAS - RVDD: 1.97 CM
BH CV ECHO MEAS - SV(LVOT): 39.8 ML
BH CV ECHO MEAS - SV(MOD-SP4): 29.2 ML
BH CV ECHO MEAS - TR MAX PG: 34.9 MMHG
BH CV ECHO MEAS - TR MAX VEL: 293.5 CM/SEC
BH CV LOW VAS LEFT PROXIMAL FEMORAL SPONT: 1
BH CV LOWER VASCULAR LEFT COMMON FEMORAL AUGMENT: NORMAL
BH CV LOWER VASCULAR LEFT COMMON FEMORAL COMPETENT: NORMAL
BH CV LOWER VASCULAR LEFT COMMON FEMORAL COMPRESS: NORMAL
BH CV LOWER VASCULAR LEFT COMMON FEMORAL PHASIC: NORMAL
BH CV LOWER VASCULAR LEFT COMMON FEMORAL SPONT: NORMAL
BH CV LOWER VASCULAR LEFT DISTAL FEMORAL COMPRESS: NORMAL
BH CV LOWER VASCULAR LEFT GREATER SAPH BK COMPRESS: NORMAL
BH CV LOWER VASCULAR LEFT LESSER SAPH COMPRESS: NORMAL
BH CV LOWER VASCULAR LEFT MID FEMORAL AUGMENT: NORMAL
BH CV LOWER VASCULAR LEFT MID FEMORAL COMPETENT: NORMAL
BH CV LOWER VASCULAR LEFT MID FEMORAL COMPRESS: NORMAL
BH CV LOWER VASCULAR LEFT MID FEMORAL PHASIC: NORMAL
BH CV LOWER VASCULAR LEFT MID FEMORAL SPONT: NORMAL
BH CV LOWER VASCULAR LEFT PERONEAL COMPRESS: NORMAL
BH CV LOWER VASCULAR LEFT POPLITEAL AUGMENT: NORMAL
BH CV LOWER VASCULAR LEFT POPLITEAL COMPETENT: NORMAL
BH CV LOWER VASCULAR LEFT POPLITEAL COMPRESS: NORMAL
BH CV LOWER VASCULAR LEFT POPLITEAL PHASIC: NORMAL
BH CV LOWER VASCULAR LEFT POPLITEAL SPONT: NORMAL
BH CV LOWER VASCULAR LEFT POSTERIOR TIBIAL COMPRESS: NORMAL
BH CV LOWER VASCULAR LEFT PROXIMAL FEMORAL AUGMENT: NORMAL
BH CV LOWER VASCULAR LEFT PROXIMAL FEMORAL COMPETENT: NORMAL
BH CV LOWER VASCULAR LEFT PROXIMAL FEMORAL COMPRESS: NORMAL
BH CV LOWER VASCULAR LEFT PROXIMAL FEMORAL PHASIC: NORMAL
BH CV LOWER VASCULAR LEFT PROXIMAL FEMORAL SPONT: NORMAL
BH CV LOWER VASCULAR LEFT PROXIMAL FEMORAL THROMBUS: NORMAL
BH CV LOWER VASCULAR LEFT SAPHENOFEMORAL JUNCTION COMPRESS: NORMAL
BH CV LOWER VASCULAR RIGHT COMMON FEMORAL AUGMENT: NORMAL
BH CV LOWER VASCULAR RIGHT COMMON FEMORAL COMPETENT: NORMAL
BH CV LOWER VASCULAR RIGHT COMMON FEMORAL COMPRESS: NORMAL
BH CV LOWER VASCULAR RIGHT COMMON FEMORAL PHASIC: NORMAL
BH CV LOWER VASCULAR RIGHT COMMON FEMORAL SPONT: NORMAL
BH CV LOWER VASCULAR RIGHT GASTRONEMIUS COMPRESS: NORMAL
BH CV LOWER VASCULAR RIGHT GREATER SAPH AK COMPRESS: NORMAL
BH CV LOWER VASCULAR RIGHT GREATER SAPH BK COMPRESS: NORMAL
BH CV LOWER VASCULAR RIGHT LESSER SAPH COMPRESS: NORMAL
BH CV LOWER VASCULAR RIGHT MID FEMORAL AUGMENT: NORMAL
BH CV LOWER VASCULAR RIGHT MID FEMORAL COMPETENT: NORMAL
BH CV LOWER VASCULAR RIGHT MID FEMORAL COMPRESS: NORMAL
BH CV LOWER VASCULAR RIGHT MID FEMORAL PHASIC: NORMAL
BH CV LOWER VASCULAR RIGHT MID FEMORAL SPONT: NORMAL
BH CV LOWER VASCULAR RIGHT PERONEAL COMPRESS: NORMAL
BH CV LOWER VASCULAR RIGHT POPLITEAL AUGMENT: NORMAL
BH CV LOWER VASCULAR RIGHT POPLITEAL COMPETENT: NORMAL
BH CV LOWER VASCULAR RIGHT POPLITEAL COMPRESS: NORMAL
BH CV LOWER VASCULAR RIGHT POPLITEAL PHASIC: NORMAL
BH CV LOWER VASCULAR RIGHT POPLITEAL SPONT: NORMAL
BH CV LOWER VASCULAR RIGHT POSTERIOR TIBIAL COMPRESS: NORMAL
BH CV LOWER VASCULAR RIGHT PROXIMAL FEMORAL COMPRESS: NORMAL
BH CV LOWER VASCULAR RIGHT SAPHENOFEMORAL JUNCTION COMPRESS: NORMAL
BILIRUB SERPL-MCNC: 0.2 MG/DL (ref 0–1.2)
BILIRUB SERPL-MCNC: 0.3 MG/DL (ref 0–1.2)
BILIRUB SERPL-MCNC: 0.5 MG/DL (ref 0–1.2)
BILIRUB SERPL-MCNC: 0.5 MG/DL (ref 0–1.2)
BILIRUB SERPL-MCNC: 0.6 MG/DL (ref 0–1.2)
BILIRUB SERPL-MCNC: 1.1 MG/DL (ref 0–1.2)
BILIRUB UR QL STRIP: NEGATIVE
BILIRUB UR QL STRIP: NEGATIVE
BLD GP AB SCN SERPL QL: NEGATIVE
BOTTLE TYPE: ABNORMAL
BUN BLDA-MCNC: 25 MG/DL (ref 8–26)
BUN SERPL-MCNC: 21 MG/DL (ref 6–20)
BUN SERPL-MCNC: 29 MG/DL (ref 6–20)
BUN SERPL-MCNC: 43 MG/DL (ref 6–20)
BUN SERPL-MCNC: 50 MG/DL (ref 6–20)
BUN SERPL-MCNC: 51 MG/DL (ref 6–20)
BUN SERPL-MCNC: 55 MG/DL (ref 6–20)
BUN SERPL-MCNC: 56 MG/DL (ref 6–20)
BUN/CREAT SERPL: 48.7 (ref 7–25)
BUN/CREAT SERPL: 48.7 (ref 7–25)
BUN/CREAT SERPL: 56 (ref 7–25)
BUN/CREAT SERPL: 58.8 (ref 7–25)
BUN/CREAT SERPL: 72.9 (ref 7–25)
BUN/CREAT SERPL: 80.6 (ref 7–25)
BUN/CREAT SERPL: 95.5 (ref 7–25)
C3 FRG RBC-MCNC: ABNORMAL
CA-I BLDA-SCNC: 0.92 MMOL/L (ref 1.12–1.32)
CA-I BLDA-SCNC: 0.94 MMOL/L (ref 1.15–1.33)
CA-I SERPL ISE-MCNC: 1.12 MMOL/L (ref 1.2–1.3)
CALCIUM SPEC-SCNC: 8.4 MG/DL (ref 8.6–10.5)
CALCIUM SPEC-SCNC: 8.5 MG/DL (ref 8.6–10.5)
CALCIUM SPEC-SCNC: 8.8 MG/DL (ref 8.6–10.5)
CALCIUM SPEC-SCNC: 8.8 MG/DL (ref 8.6–10.5)
CALCIUM SPEC-SCNC: 8.9 MG/DL (ref 8.6–10.5)
CALCIUM SPEC-SCNC: 9 MG/DL (ref 8.6–10.5)
CALCIUM SPEC-SCNC: 9 MG/DL (ref 8.6–10.5)
CHLORIDE BLDA-SCNC: 91 MMOL/L (ref 98–109)
CHLORIDE SERPL-SCNC: 102 MMOL/L (ref 98–107)
CHLORIDE SERPL-SCNC: 105 MMOL/L (ref 98–107)
CHLORIDE SERPL-SCNC: 106 MMOL/L (ref 98–107)
CHLORIDE SERPL-SCNC: 107 MMOL/L (ref 98–107)
CHLORIDE SERPL-SCNC: 108 MMOL/L (ref 98–107)
CHLORIDE SERPL-SCNC: 109 MMOL/L (ref 98–107)
CHLORIDE SERPL-SCNC: 94 MMOL/L (ref 98–107)
CHOLEST SERPL-MCNC: 87 MG/DL (ref 0–200)
CLARITY UR: ABNORMAL
CLARITY UR: ABNORMAL
CO2 BLDA-SCNC: 26 MMOL/L (ref 22–29)
CO2 BLDA-SCNC: 29.3 MMOL/L (ref 22–29)
CO2 BLDA-SCNC: 31.5 MMOL/L (ref 22–29)
CO2 BLDA-SCNC: 32.3 MMOL/L (ref 22–29)
CO2 BLDA-SCNC: 34 MMOL/L (ref 22–29)
CO2 BLDA-SCNC: 43 MMOL/L (ref 24–29)
CO2 SERPL-SCNC: 25 MMOL/L (ref 22–29)
CO2 SERPL-SCNC: 28 MMOL/L (ref 22–29)
CO2 SERPL-SCNC: 30 MMOL/L (ref 22–29)
CO2 SERPL-SCNC: 31 MMOL/L (ref 22–29)
CO2 SERPL-SCNC: 32 MMOL/L (ref 22–29)
CO2 SERPL-SCNC: 33 MMOL/L (ref 22–29)
CO2 SERPL-SCNC: 47 MMOL/L (ref 22–29)
COLOR UR: YELLOW
COLOR UR: YELLOW
CREAT BLDA-MCNC: 0.5 MG/DL (ref 0.6–1.3)
CREAT SERPL-MCNC: 0.22 MG/DL (ref 0.57–1)
CREAT SERPL-MCNC: 0.36 MG/DL (ref 0.57–1)
CREAT SERPL-MCNC: 0.59 MG/DL (ref 0.57–1)
CREAT SERPL-MCNC: 0.85 MG/DL (ref 0.57–1)
CREAT SERPL-MCNC: 0.91 MG/DL (ref 0.57–1)
CREAT SERPL-MCNC: 1.13 MG/DL (ref 0.57–1)
CREAT SERPL-MCNC: 1.15 MG/DL (ref 0.57–1)
CROSSMATCH INTERPRETATION: NORMAL
CROSSMATCH INTERPRETATION: NORMAL
CRP SERPL-MCNC: 10.24 MG/DL (ref 0–0.5)
D-LACTATE SERPL-SCNC: 10.5 MMOL/L (ref 0.5–2)
D-LACTATE SERPL-SCNC: 2 MMOL/L (ref 0.5–2)
D-LACTATE SERPL-SCNC: 2.3 MMOL/L (ref 0.5–2)
D-LACTATE SERPL-SCNC: 2.5 MMOL/L (ref 0.5–2)
D-LACTATE SERPL-SCNC: 4.9 MMOL/L (ref 0.5–2)
D-LACTATE SERPL-SCNC: 5.7 MMOL/L (ref 0.5–2)
D-LACTATE SERPL-SCNC: 6.6 MMOL/L (ref 0.5–2)
DEPRECATED RDW RBC AUTO: 55.6 FL (ref 37–54)
DEPRECATED RDW RBC AUTO: 56.9 FL (ref 37–54)
DEPRECATED RDW RBC AUTO: 63 FL (ref 37–54)
DEPRECATED RDW RBC AUTO: 63 FL (ref 37–54)
DEPRECATED RDW RBC AUTO: 66.5 FL (ref 37–54)
DEPRECATED RDW RBC AUTO: 67.8 FL (ref 37–54)
DIMORPHIC RBC: PRESENT
EGFRCR SERPLBLD CKD-EPI 2021: 104 ML/MIN/1.73
EGFRCR SERPLBLD CKD-EPI 2021: 108.2 ML/MIN/1.73
EGFRCR SERPLBLD CKD-EPI 2021: 117.1 ML/MIN/1.73
EGFRCR SERPLBLD CKD-EPI 2021: 131.9 ML/MIN/1.73
EGFRCR SERPLBLD CKD-EPI 2021: 55 ML/MIN/1.73
EGFRCR SERPLBLD CKD-EPI 2021: 56.2 ML/MIN/1.73
EGFRCR SERPLBLD CKD-EPI 2021: 72.8 ML/MIN/1.73
EGFRCR SERPLBLD CKD-EPI 2021: 79 ML/MIN/1.73
EOSINOPHIL # BLD AUTO: 0 10*3/MM3 (ref 0–0.4)
EOSINOPHIL # BLD AUTO: 0.1 10*3/MM3 (ref 0–0.4)
EOSINOPHIL # BLD AUTO: 0.1 10*3/MM3 (ref 0–0.4)
EOSINOPHIL NFR BLD AUTO: 0 % (ref 0.3–6.2)
EOSINOPHIL NFR BLD AUTO: 0.3 % (ref 0.3–6.2)
EOSINOPHIL NFR BLD AUTO: 0.4 % (ref 0.3–6.2)
ERYTHROCYTE [DISTWIDTH] IN BLOOD BY AUTOMATED COUNT: 25.3 % (ref 12.3–15.4)
ERYTHROCYTE [DISTWIDTH] IN BLOOD BY AUTOMATED COUNT: 25.4 % (ref 12.3–15.4)
ERYTHROCYTE [DISTWIDTH] IN BLOOD BY AUTOMATED COUNT: 29 % (ref 12.3–15.4)
ERYTHROCYTE [DISTWIDTH] IN BLOOD BY AUTOMATED COUNT: 29.6 % (ref 12.3–15.4)
ERYTHROCYTE [DISTWIDTH] IN BLOOD BY AUTOMATED COUNT: 29.9 % (ref 12.3–15.4)
ERYTHROCYTE [DISTWIDTH] IN BLOOD BY AUTOMATED COUNT: 30.2 % (ref 12.3–15.4)
GLOBULIN UR ELPH-MCNC: 1.7 GM/DL
GLOBULIN UR ELPH-MCNC: 2 GM/DL
GLOBULIN UR ELPH-MCNC: 2 GM/DL
GLOBULIN UR ELPH-MCNC: 2.1 GM/DL
GLOBULIN UR ELPH-MCNC: 2.2 GM/DL
GLOBULIN UR ELPH-MCNC: 2.2 GM/DL
GLUCOSE BLDC GLUCOMTR-MCNC: 104 MG/DL (ref 70–105)
GLUCOSE BLDC GLUCOMTR-MCNC: 112 MG/DL (ref 70–105)
GLUCOSE BLDC GLUCOMTR-MCNC: 120 MG/DL (ref 70–105)
GLUCOSE BLDC GLUCOMTR-MCNC: 122 MG/DL (ref 70–105)
GLUCOSE BLDC GLUCOMTR-MCNC: 122 MG/DL (ref 70–105)
GLUCOSE BLDC GLUCOMTR-MCNC: 124 MG/DL (ref 70–105)
GLUCOSE BLDC GLUCOMTR-MCNC: 134 MG/DL (ref 70–105)
GLUCOSE BLDC GLUCOMTR-MCNC: 138 MG/DL (ref 70–105)
GLUCOSE BLDC GLUCOMTR-MCNC: 195 MG/DL (ref 70–105)
GLUCOSE BLDC GLUCOMTR-MCNC: 234 MG/DL (ref 70–105)
GLUCOSE BLDC GLUCOMTR-MCNC: 237 MG/DL (ref 70–105)
GLUCOSE BLDC GLUCOMTR-MCNC: 376 MG/DL (ref 74–100)
GLUCOSE BLDC GLUCOMTR-MCNC: 376 MG/DL (ref 74–100)
GLUCOSE BLDC GLUCOMTR-MCNC: 51 MG/DL (ref 70–105)
GLUCOSE BLDC GLUCOMTR-MCNC: 73 MG/DL (ref 70–105)
GLUCOSE BLDC GLUCOMTR-MCNC: 87 MG/DL (ref 70–105)
GLUCOSE SERPL-MCNC: 121 MG/DL (ref 65–99)
GLUCOSE SERPL-MCNC: 127 MG/DL (ref 65–99)
GLUCOSE SERPL-MCNC: 131 MG/DL (ref 65–99)
GLUCOSE SERPL-MCNC: 139 MG/DL (ref 65–99)
GLUCOSE SERPL-MCNC: 240 MG/DL (ref 65–99)
GLUCOSE SERPL-MCNC: 251 MG/DL (ref 65–99)
GLUCOSE SERPL-MCNC: 88 MG/DL (ref 65–99)
GLUCOSE UR STRIP-MCNC: ABNORMAL MG/DL
GLUCOSE UR STRIP-MCNC: NEGATIVE MG/DL
GRAM STN SPEC: ABNORMAL
GRAN CASTS URNS QL MICRO: ABNORMAL /LPF
HCO3 BLDA-SCNC: 24.9 MMOL/L (ref 21–28)
HCO3 BLDA-SCNC: 28.4 MMOL/L (ref 21–28)
HCO3 BLDA-SCNC: 29.2 MMOL/L (ref 21–28)
HCO3 BLDA-SCNC: 31.2 MMOL/L (ref 21–28)
HCO3 BLDA-SCNC: 32.7 MMOL/L (ref 21–28)
HCT VFR BLD AUTO: 22.4 % (ref 34–46.6)
HCT VFR BLD AUTO: 24.5 % (ref 34–46.6)
HCT VFR BLD AUTO: 25.5 % (ref 34–46.6)
HCT VFR BLD AUTO: 25.5 % (ref 34–46.6)
HCT VFR BLD AUTO: 26 % (ref 34–46.6)
HCT VFR BLD AUTO: 26 % (ref 34–46.6)
HCT VFR BLD AUTO: 26.1 % (ref 34–46.6)
HCT VFR BLD AUTO: 26.2 % (ref 34–46.6)
HCT VFR BLD AUTO: 26.4 % (ref 34–46.6)
HCT VFR BLD AUTO: 27.1 % (ref 34–46.6)
HCT VFR BLD AUTO: 27.4 % (ref 34–46.6)
HCT VFR BLD AUTO: 27.4 % (ref 34–46.6)
HCT VFR BLD AUTO: 28.3 % (ref 34–46.6)
HCT VFR BLD AUTO: 29.1 % (ref 34–46.6)
HCT VFR BLD AUTO: 29.6 % (ref 34–46.6)
HCT VFR BLD AUTO: 29.7 % (ref 34–46.6)
HCT VFR BLD AUTO: 30.9 % (ref 34–46.6)
HCT VFR BLDA CALC: 21 % (ref 38–51)
HCT VFR BLDA CALC: 29 % (ref 38–51)
HDLC SERPL-MCNC: 45 MG/DL (ref 40–60)
HEMODILUTION: NO
HGB BLD-MCNC: 7 G/DL (ref 12–15.9)
HGB BLD-MCNC: 7.2 G/DL (ref 12–15.9)
HGB BLD-MCNC: 8 G/DL (ref 12–15.9)
HGB BLD-MCNC: 8 G/DL (ref 12–15.9)
HGB BLD-MCNC: 8.1 G/DL (ref 12–15.9)
HGB BLD-MCNC: 8.2 G/DL (ref 12–15.9)
HGB BLD-MCNC: 8.2 G/DL (ref 12–15.9)
HGB BLD-MCNC: 8.3 G/DL (ref 12–15.9)
HGB BLD-MCNC: 8.5 G/DL (ref 12–15.9)
HGB BLD-MCNC: 8.6 G/DL (ref 12–15.9)
HGB BLD-MCNC: 8.7 G/DL (ref 12–15.9)
HGB BLD-MCNC: 8.8 G/DL (ref 12–15.9)
HGB BLD-MCNC: 9.1 G/DL (ref 12–15.9)
HGB BLD-MCNC: 9.3 G/DL (ref 12–15.9)
HGB BLD-MCNC: 9.8 G/DL (ref 12–15.9)
HGB BLDA-MCNC: 7 G/DL (ref 12–17)
HGB BLDA-MCNC: 9.9 G/DL (ref 12–17)
HGB UR QL STRIP.AUTO: ABNORMAL
HGB UR QL STRIP.AUTO: ABNORMAL
HYALINE CASTS UR QL AUTO: ABNORMAL /LPF
HYALINE CASTS UR QL AUTO: ABNORMAL /LPF
HYPOCHROMIA BLD QL: ABNORMAL
HYPOCHROMIA BLD QL: NORMAL
INHALED O2 CONCENTRATION: 100 %
INHALED O2 CONCENTRATION: 40 %
INHALED O2 CONCENTRATION: 60 %
INR PPP: 1.73 (ref 0.93–1.1)
ISOLATED FROM: ABNORMAL
ISOLATED FROM: ABNORMAL
KETONES UR QL STRIP: NEGATIVE
KETONES UR QL STRIP: NEGATIVE
L PNEUMO1 AG UR QL IA: NEGATIVE
LARGE PLATELETS: ABNORMAL
LARGE PLATELETS: NORMAL
LDLC SERPL CALC-MCNC: 26 MG/DL (ref 0–100)
LDLC/HDLC SERPL: 0.59 {RATIO}
LEUKOCYTE ESTERASE UR QL STRIP.AUTO: ABNORMAL
LEUKOCYTE ESTERASE UR QL STRIP.AUTO: ABNORMAL
LYMPHOCYTES # BLD AUTO: 1 10*3/MM3 (ref 0.7–3.1)
LYMPHOCYTES # BLD AUTO: 1.1 10*3/MM3 (ref 0.7–3.1)
LYMPHOCYTES # BLD AUTO: 6.1 10*3/MM3 (ref 0.7–3.1)
LYMPHOCYTES # BLD MANUAL: 0.39 10*3/MM3 (ref 0.7–3.1)
LYMPHOCYTES # BLD MANUAL: 0.66 10*3/MM3 (ref 0.7–3.1)
LYMPHOCYTES # BLD MANUAL: 2.27 10*3/MM3 (ref 0.7–3.1)
LYMPHOCYTES # BLD MANUAL: 5.83 10*3/MM3 (ref 0.7–3.1)
LYMPHOCYTES NFR BLD AUTO: 37.8 % (ref 19.6–45.3)
LYMPHOCYTES NFR BLD AUTO: 5.8 % (ref 19.6–45.3)
LYMPHOCYTES NFR BLD AUTO: 6 % (ref 19.6–45.3)
LYMPHOCYTES NFR BLD MANUAL: 1 % (ref 5–12)
LYMPHOCYTES NFR BLD MANUAL: 2 % (ref 5–12)
LYMPHOCYTES NFR BLD MANUAL: 3 % (ref 5–12)
LYMPHOCYTES NFR BLD MANUAL: 3 % (ref 5–12)
MAGNESIUM SERPL-MCNC: 1.9 MG/DL (ref 1.6–2.6)
MAGNESIUM SERPL-MCNC: 1.9 MG/DL (ref 1.6–2.6)
MAGNESIUM SERPL-MCNC: 2 MG/DL (ref 1.6–2.6)
MAGNESIUM SERPL-MCNC: 2.1 MG/DL (ref 1.6–2.6)
MAGNESIUM SERPL-MCNC: 2.1 MG/DL (ref 1.6–2.6)
MAGNESIUM SERPL-MCNC: 2.2 MG/DL (ref 1.6–2.6)
MAGNESIUM SERPL-MCNC: 2.5 MG/DL (ref 1.6–2.6)
MAXIMAL PREDICTED HEART RATE: 161 BPM
MAXIMAL PREDICTED HEART RATE: 161 BPM
MCH RBC QN AUTO: 17.6 PG (ref 26.6–33)
MCH RBC QN AUTO: 17.6 PG (ref 26.6–33)
MCH RBC QN AUTO: 19.9 PG (ref 26.6–33)
MCH RBC QN AUTO: 20.2 PG (ref 26.6–33)
MCH RBC QN AUTO: 20.4 PG (ref 26.6–33)
MCH RBC QN AUTO: 20.6 PG (ref 26.6–33)
MCHC RBC AUTO-ENTMCNC: 27.8 G/DL (ref 31.5–35.7)
MCHC RBC AUTO-ENTMCNC: 28.6 G/DL (ref 31.5–35.7)
MCHC RBC AUTO-ENTMCNC: 31.2 G/DL (ref 31.5–35.7)
MCHC RBC AUTO-ENTMCNC: 31.6 G/DL (ref 31.5–35.7)
MCHC RBC AUTO-ENTMCNC: 31.7 G/DL (ref 31.5–35.7)
MCHC RBC AUTO-ENTMCNC: 32.1 G/DL (ref 31.5–35.7)
MCV RBC AUTO: 61.5 FL (ref 79–97)
MCV RBC AUTO: 62.8 FL (ref 79–97)
MCV RBC AUTO: 62.9 FL (ref 79–97)
MCV RBC AUTO: 63.1 FL (ref 79–97)
MCV RBC AUTO: 64.9 FL (ref 79–97)
MCV RBC AUTO: 65.5 FL (ref 79–97)
METAMYELOCYTES NFR BLD MANUAL: 2 % (ref 0–0)
METAMYELOCYTES NFR BLD MANUAL: 4 % (ref 0–0)
MICROCYTES BLD QL: ABNORMAL
MICROCYTES BLD QL: NORMAL
MODALITY: ABNORMAL
MONOCYTES # BLD AUTO: 0.6 10*3/MM3 (ref 0.1–0.9)
MONOCYTES # BLD AUTO: 0.6 10*3/MM3 (ref 0.1–0.9)
MONOCYTES # BLD AUTO: 0.9 10*3/MM3 (ref 0.1–0.9)
MONOCYTES # BLD: 0.16 10*3/MM3 (ref 0.1–0.9)
MONOCYTES # BLD: 0.5 10*3/MM3 (ref 0.1–0.9)
MONOCYTES # BLD: 0.5 10*3/MM3 (ref 0.1–0.9)
MONOCYTES # BLD: 0.58 10*3/MM3 (ref 0.1–0.9)
MONOCYTES NFR BLD AUTO: 3.4 % (ref 5–12)
MONOCYTES NFR BLD AUTO: 3.4 % (ref 5–12)
MONOCYTES NFR BLD AUTO: 5.4 % (ref 5–12)
MRSA DNA SPEC QL NAA+PROBE: NORMAL
MYELOCYTES NFR BLD MANUAL: 2 % (ref 0–0)
NEUTROPHILS # BLD AUTO: 15.44 10*3/MM3 (ref 1.7–7)
NEUTROPHILS # BLD AUTO: 18.34 10*3/MM3 (ref 1.7–7)
NEUTROPHILS # BLD AUTO: 21.92 10*3/MM3 (ref 1.7–7)
NEUTROPHILS # BLD AUTO: 9.23 10*3/MM3 (ref 1.7–7)
NEUTROPHILS NFR BLD AUTO: 14.7 10*3/MM3 (ref 1.7–7)
NEUTROPHILS NFR BLD AUTO: 17 10*3/MM3 (ref 1.7–7)
NEUTROPHILS NFR BLD AUTO: 57.9 % (ref 42.7–76)
NEUTROPHILS NFR BLD AUTO: 88.4 % (ref 42.7–76)
NEUTROPHILS NFR BLD AUTO: 9.4 10*3/MM3 (ref 1.7–7)
NEUTROPHILS NFR BLD AUTO: 90.2 % (ref 42.7–76)
NEUTROPHILS NFR BLD MANUAL: 49 % (ref 42.7–76)
NEUTROPHILS NFR BLD MANUAL: 59 % (ref 42.7–76)
NEUTROPHILS NFR BLD MANUAL: 66 % (ref 42.7–76)
NEUTROPHILS NFR BLD MANUAL: 74 % (ref 42.7–76)
NEUTS BAND NFR BLD MANUAL: 21 % (ref 0–5)
NEUTS BAND NFR BLD MANUAL: 27 % (ref 0–5)
NEUTS BAND NFR BLD MANUAL: 28 % (ref 0–5)
NEUTS BAND NFR BLD MANUAL: 8 % (ref 0–5)
NEUTS VAC BLD QL SMEAR: ABNORMAL
NEUTS VAC BLD QL SMEAR: NORMAL
NITRITE UR QL STRIP: NEGATIVE
NITRITE UR QL STRIP: NEGATIVE
NRBC BLD AUTO-RTO: 0 /100 WBC (ref 0–0.2)
NRBC BLD AUTO-RTO: 0.1 /100 WBC (ref 0–0.2)
NRBC SPEC MANUAL: 1 /100 WBC (ref 0–0.2)
NRBC SPEC MANUAL: 3 /100 WBC (ref 0–0.2)
PCO2 BLDA: 31.4 MM HG (ref 35–48)
PCO2 BLDA: 35 MM HG (ref 35–48)
PCO2 BLDA: 35.5 MM HG (ref 35–48)
PCO2 BLDA: 43.3 MM HG (ref 35–48)
PCO2 BLDA: 74 MM HG (ref 35–48)
PEEP RESPIRATORY: 5 CM[H2O]
PH BLDA: 7.2 PH UNITS (ref 7.35–7.45)
PH BLDA: 7.46 PH UNITS (ref 7.35–7.45)
PH BLDA: 7.49 PH UNITS (ref 7.35–7.45)
PH BLDA: 7.56 PH UNITS (ref 7.35–7.45)
PH BLDA: 7.56 PH UNITS (ref 7.35–7.45)
PH UR STRIP.AUTO: 8 [PH] (ref 5–8)
PH UR STRIP.AUTO: <=5 [PH] (ref 5–8)
PHOSPHATE SERPL-MCNC: 2.9 MG/DL (ref 2.5–4.5)
PHOSPHATE SERPL-MCNC: 3.1 MG/DL (ref 2.5–4.5)
PHOSPHATE SERPL-MCNC: 3.2 MG/DL (ref 2.5–4.5)
PHOSPHATE SERPL-MCNC: 3.7 MG/DL (ref 2.5–4.5)
PHOSPHATE SERPL-MCNC: 4.3 MG/DL (ref 2.5–4.5)
PHOSPHATE SERPL-MCNC: 8.1 MG/DL (ref 2.5–4.5)
PLAT MORPH BLD: NORMAL
PLATELET # BLD AUTO: 141 10*3/MM3 (ref 140–450)
PLATELET # BLD AUTO: 162 10*3/MM3 (ref 140–450)
PLATELET # BLD AUTO: 171 10*3/MM3 (ref 140–450)
PLATELET # BLD AUTO: 307 10*3/MM3 (ref 140–450)
PLATELET # BLD AUTO: 329 10*3/MM3 (ref 140–450)
PLATELET # BLD AUTO: 468 10*3/MM3 (ref 140–450)
PMV BLD AUTO: 7.8 FL (ref 6–12)
PMV BLD AUTO: 8.1 FL (ref 6–12)
PMV BLD AUTO: 8.2 FL (ref 6–12)
PMV BLD AUTO: 8.2 FL (ref 6–12)
PMV BLD AUTO: 8.3 FL (ref 6–12)
PMV BLD AUTO: 8.4 FL (ref 6–12)
PO2 BLDA: 100.6 MM HG (ref 83–108)
PO2 BLDA: 107.5 MM HG (ref 83–108)
PO2 BLDA: 120.2 MM HG (ref 83–108)
PO2 BLDA: 77.9 MM HG (ref 83–108)
PO2 BLDA: 95.6 MM HG (ref 83–108)
POIKILOCYTOSIS BLD QL SMEAR: ABNORMAL
POIKILOCYTOSIS BLD QL SMEAR: NORMAL
POIKILOCYTOSIS BLD QL SMEAR: NORMAL
POTASSIUM BLDA-SCNC: 3.5 MMOL/L (ref 3.5–4.5)
POTASSIUM BLDA-SCNC: 4.7 MMOL/L (ref 3.5–4.9)
POTASSIUM SERPL-SCNC: 2.9 MMOL/L (ref 3.5–5.2)
POTASSIUM SERPL-SCNC: 3.1 MMOL/L (ref 3.5–5.2)
POTASSIUM SERPL-SCNC: 3.8 MMOL/L (ref 3.5–5.2)
POTASSIUM SERPL-SCNC: 3.8 MMOL/L (ref 3.5–5.2)
POTASSIUM SERPL-SCNC: 4.1 MMOL/L (ref 3.5–5.2)
POTASSIUM SERPL-SCNC: 4.2 MMOL/L (ref 3.5–5.2)
POTASSIUM SERPL-SCNC: 4.8 MMOL/L (ref 3.5–5.2)
POTASSIUM SERPL-SCNC: 5.1 MMOL/L (ref 3.5–5.2)
PROCALCITONIN SERPL-MCNC: 0.06 NG/ML (ref 0–0.25)
PROCALCITONIN SERPL-MCNC: 1.19 NG/ML (ref 0–0.25)
PROCALCITONIN SERPL-MCNC: 15.59 NG/ML (ref 0–0.25)
PROT SERPL-MCNC: 5.1 G/DL (ref 6–8.5)
PROT SERPL-MCNC: 5.2 G/DL (ref 6–8.5)
PROT SERPL-MCNC: 5.4 G/DL (ref 6–8.5)
PROT SERPL-MCNC: 5.7 G/DL (ref 6–8.5)
PROT SERPL-MCNC: 5.7 G/DL (ref 6–8.5)
PROT SERPL-MCNC: 5.9 G/DL (ref 6–8.5)
PROT UR QL STRIP: ABNORMAL
PROT UR QL STRIP: ABNORMAL
PROTHROMBIN TIME: 17.3 SECONDS (ref 9.6–11.7)
PSV: 5 CMH2O
RBC # BLD AUTO: 3.57 10*6/MM3 (ref 3.77–5.28)
RBC # BLD AUTO: 3.98 10*6/MM3 (ref 3.77–5.28)
RBC # BLD AUTO: 4.01 10*6/MM3 (ref 3.77–5.28)
RBC # BLD AUTO: 4.32 10*6/MM3 (ref 3.77–5.28)
RBC # BLD AUTO: 4.61 10*6/MM3 (ref 3.77–5.28)
RBC # BLD AUTO: 4.7 10*6/MM3 (ref 3.77–5.28)
RBC # UR STRIP: ABNORMAL /HPF
RBC # UR STRIP: ABNORMAL /HPF
REF LAB TEST METHOD: ABNORMAL
REF LAB TEST METHOD: ABNORMAL
RESPIRATORY RATE: 14
RESPIRATORY RATE: 16
RESPIRATORY RATE: 18
RH BLD: NEGATIVE
S PNEUM AG SPEC QL LA: NEGATIVE
SAO2 % BLDCOA: 96.2 % (ref 94–98)
SAO2 % BLDCOA: 97.4 % (ref 94–98)
SAO2 % BLDCOA: 98.4 % (ref 94–98)
SAO2 % BLDCOA: 98.5 % (ref 94–98)
SAO2 % BLDCOA: 98.7 % (ref 94–98)
SARS-COV-2 RNA RESP QL NAA+PROBE: NOT DETECTED
SCAN SLIDE: NORMAL
SMALL PLATELETS BLD QL SMEAR: ABNORMAL
SODIUM BLD-SCNC: 142 MMOL/L (ref 138–146)
SODIUM BLD-SCNC: 150 MMOL/L (ref 138–146)
SODIUM SERPL-SCNC: 143 MMOL/L (ref 136–145)
SODIUM SERPL-SCNC: 150 MMOL/L (ref 136–145)
SODIUM SERPL-SCNC: 151 MMOL/L (ref 136–145)
SODIUM SERPL-SCNC: 151 MMOL/L (ref 136–145)
SODIUM SERPL-SCNC: 152 MMOL/L (ref 136–145)
SODIUM SERPL-SCNC: 152 MMOL/L (ref 136–145)
SODIUM SERPL-SCNC: 153 MMOL/L (ref 136–145)
SP GR UR STRIP: 1.02 (ref 1–1.03)
SP GR UR STRIP: 1.02 (ref 1–1.03)
SQUAMOUS #/AREA URNS HPF: ABNORMAL /HPF
SQUAMOUS #/AREA URNS HPF: ABNORMAL /HPF
STRESS TARGET HR: 137 BPM
STRESS TARGET HR: 137 BPM
T&S EXPIRATION DATE: NORMAL
TARGETS BLD QL SMEAR: ABNORMAL
TARGETS BLD QL SMEAR: ABNORMAL
TOXIC GRANULATION: ABNORMAL
TRIGL SERPL-MCNC: 78 MG/DL (ref 0–150)
TROPONIN T SERPL-MCNC: 0.88 NG/ML (ref 0–0.03)
UNIT  ABO: NORMAL
UNIT  ABO: NORMAL
UNIT  RH: NORMAL
UNIT  RH: NORMAL
UROBILINOGEN UR QL STRIP: ABNORMAL
UROBILINOGEN UR QL STRIP: ABNORMAL
VARIANT LYMPHS NFR BLD MANUAL: 1 % (ref 0–5)
VARIANT LYMPHS NFR BLD MANUAL: 2 % (ref 19.6–45.3)
VARIANT LYMPHS NFR BLD MANUAL: 35 % (ref 19.6–45.3)
VARIANT LYMPHS NFR BLD MANUAL: 4 % (ref 19.6–45.3)
VARIANT LYMPHS NFR BLD MANUAL: 9 % (ref 19.6–45.3)
VENTILATOR MODE: ABNORMAL
VLDLC SERPL-MCNC: 16 MG/DL (ref 5–40)
VT ON VENT VENT: 450 ML
WBC # UR STRIP: ABNORMAL /HPF
WBC # UR STRIP: ABNORMAL /HPF
WBC MORPH BLD: NORMAL
WBC NRBC COR # BLD: 16.2 10*3/MM3 (ref 3.4–10.8)
WBC NRBC COR # BLD: 16.6 10*3/MM3 (ref 3.4–10.8)
WBC NRBC COR # BLD: 16.6 10*3/MM3 (ref 3.4–10.8)
WBC NRBC COR # BLD: 18.8 10*3/MM3 (ref 3.4–10.8)
WBC NRBC COR # BLD: 19.3 10*3/MM3 (ref 3.4–10.8)
WBC NRBC COR # BLD: 25.2 10*3/MM3 (ref 3.4–10.8)

## 2022-01-01 PROCEDURE — 87641 MR-STAPH DNA AMP PROBE: CPT

## 2022-01-01 PROCEDURE — 80053 COMPREHEN METABOLIC PANEL: CPT

## 2022-01-01 PROCEDURE — 36430 TRANSFUSION BLD/BLD COMPNT: CPT

## 2022-01-01 PROCEDURE — 84145 PROCALCITONIN (PCT): CPT

## 2022-01-01 PROCEDURE — 36600 WITHDRAWAL OF ARTERIAL BLOOD: CPT

## 2022-01-01 PROCEDURE — 25010000002 CEFTRIAXONE PER 250 MG

## 2022-01-01 PROCEDURE — 85014 HEMATOCRIT: CPT | Performed by: STUDENT IN AN ORGANIZED HEALTH CARE EDUCATION/TRAINING PROGRAM

## 2022-01-01 PROCEDURE — 99232 SBSQ HOSP IP/OBS MODERATE 35: CPT | Performed by: PSYCHIATRY & NEUROLOGY

## 2022-01-01 PROCEDURE — 94003 VENT MGMT INPAT SUBQ DAY: CPT

## 2022-01-01 PROCEDURE — 94799 UNLISTED PULMONARY SVC/PX: CPT

## 2022-01-01 PROCEDURE — 85018 HEMOGLOBIN: CPT | Performed by: STUDENT IN AN ORGANIZED HEALTH CARE EDUCATION/TRAINING PROGRAM

## 2022-01-01 PROCEDURE — 84100 ASSAY OF PHOSPHORUS: CPT

## 2022-01-01 PROCEDURE — 85025 COMPLETE CBC W/AUTO DIFF WBC: CPT

## 2022-01-01 PROCEDURE — 25010000002 CALCIUM GLUCONATE 2-0.675 GM/100ML-% SOLUTION: Performed by: INTERNAL MEDICINE

## 2022-01-01 PROCEDURE — 94761 N-INVAS EAR/PLS OXIMETRY MLT: CPT

## 2022-01-01 PROCEDURE — 87086 URINE CULTURE/COLONY COUNT: CPT

## 2022-01-01 PROCEDURE — 82962 GLUCOSE BLOOD TEST: CPT

## 2022-01-01 PROCEDURE — 86140 C-REACTIVE PROTEIN: CPT

## 2022-01-01 PROCEDURE — 87040 BLOOD CULTURE FOR BACTERIA: CPT | Performed by: NURSE PRACTITIONER

## 2022-01-01 PROCEDURE — 85018 HEMOGLOBIN: CPT

## 2022-01-01 PROCEDURE — 82330 ASSAY OF CALCIUM: CPT

## 2022-01-01 PROCEDURE — 94002 VENT MGMT INPAT INIT DAY: CPT

## 2022-01-01 PROCEDURE — 85007 BL SMEAR W/DIFF WBC COUNT: CPT | Performed by: EMERGENCY MEDICINE

## 2022-01-01 PROCEDURE — 71045 X-RAY EXAM CHEST 1 VIEW: CPT

## 2022-01-01 PROCEDURE — 0BH17EZ INSERTION OF ENDOTRACHEAL AIRWAY INTO TRACHEA, VIA NATURAL OR ARTIFICIAL OPENING: ICD-10-PCS | Performed by: INTERNAL MEDICINE

## 2022-01-01 PROCEDURE — 85007 BL SMEAR W/DIFF WBC COUNT: CPT

## 2022-01-01 PROCEDURE — U0003 INFECTIOUS AGENT DETECTION BY NUCLEIC ACID (DNA OR RNA); SEVERE ACUTE RESPIRATORY SYNDROME CORONAVIRUS 2 (SARS-COV-2) (CORONAVIRUS DISEASE [COVID-19]), AMPLIFIED PROBE TECHNIQUE, MAKING USE OF HIGH THROUGHPUT TECHNOLOGIES AS DESCRIBED BY CMS-2020-01-R: HCPCS | Performed by: EMERGENCY MEDICINE

## 2022-01-01 PROCEDURE — 25010000002 MAGNESIUM SULFATE IN D5W 1G/100ML (PREMIX) 1-5 GM/100ML-% SOLUTION

## 2022-01-01 PROCEDURE — 25010000002 METHYLPREDNISOLONE PER 40 MG

## 2022-01-01 PROCEDURE — 93010 ELECTROCARDIOGRAM REPORT: CPT | Performed by: INTERNAL MEDICINE

## 2022-01-01 PROCEDURE — 87899 AGENT NOS ASSAY W/OPTIC: CPT | Performed by: NURSE PRACTITIONER

## 2022-01-01 PROCEDURE — 87150 DNA/RNA AMPLIFIED PROBE: CPT

## 2022-01-01 PROCEDURE — 85730 THROMBOPLASTIN TIME PARTIAL: CPT

## 2022-01-01 PROCEDURE — 82803 BLOOD GASES ANY COMBINATION: CPT

## 2022-01-01 PROCEDURE — 25010000002 HYDROCORTISONE SODIUM SUCCINATE 100 MG RECONSTITUTED SOLUTION

## 2022-01-01 PROCEDURE — 85014 HEMATOCRIT: CPT

## 2022-01-01 PROCEDURE — 71275 CT ANGIOGRAPHY CHEST: CPT

## 2022-01-01 PROCEDURE — 80061 LIPID PANEL: CPT

## 2022-01-01 PROCEDURE — 80051 ELECTROLYTE PANEL: CPT

## 2022-01-01 PROCEDURE — 87205 SMEAR GRAM STAIN: CPT

## 2022-01-01 PROCEDURE — 80047 BASIC METABLC PNL IONIZED CA: CPT

## 2022-01-01 PROCEDURE — 5A12012 PERFORMANCE OF CARDIAC OUTPUT, SINGLE, MANUAL: ICD-10-PCS | Performed by: INTERNAL MEDICINE

## 2022-01-01 PROCEDURE — 87186 SC STD MICRODIL/AGAR DIL: CPT

## 2022-01-01 PROCEDURE — 93005 ELECTROCARDIOGRAM TRACING: CPT | Performed by: PHYSICIAN ASSISTANT

## 2022-01-01 PROCEDURE — 85025 COMPLETE CBC W/AUTO DIFF WBC: CPT | Performed by: EMERGENCY MEDICINE

## 2022-01-01 PROCEDURE — 83735 ASSAY OF MAGNESIUM: CPT

## 2022-01-01 PROCEDURE — 86923 COMPATIBILITY TEST ELECTRIC: CPT

## 2022-01-01 PROCEDURE — 99222 1ST HOSP IP/OBS MODERATE 55: CPT | Performed by: PSYCHIATRY & NEUROLOGY

## 2022-01-01 PROCEDURE — 02HV33Z INSERTION OF INFUSION DEVICE INTO SUPERIOR VENA CAVA, PERCUTANEOUS APPROACH: ICD-10-PCS | Performed by: INTERNAL MEDICINE

## 2022-01-01 PROCEDURE — 99222 1ST HOSP IP/OBS MODERATE 55: CPT | Performed by: SURGERY

## 2022-01-01 PROCEDURE — 5A1935Z RESPIRATORY VENTILATION, LESS THAN 24 CONSECUTIVE HOURS: ICD-10-PCS | Performed by: INTERNAL MEDICINE

## 2022-01-01 PROCEDURE — 36556 INSERT NON-TUNNEL CV CATH: CPT

## 2022-01-01 PROCEDURE — 84132 ASSAY OF SERUM POTASSIUM: CPT | Performed by: STUDENT IN AN ORGANIZED HEALTH CARE EDUCATION/TRAINING PROGRAM

## 2022-01-01 PROCEDURE — 93970 EXTREMITY STUDY: CPT

## 2022-01-01 PROCEDURE — 87070 CULTURE OTHR SPECIMN AEROBIC: CPT

## 2022-01-01 PROCEDURE — 83605 ASSAY OF LACTIC ACID: CPT

## 2022-01-01 PROCEDURE — 87086 URINE CULTURE/COLONY COUNT: CPT | Performed by: NURSE PRACTITIONER

## 2022-01-01 PROCEDURE — 25010000002 HYDROMORPHONE 1 MG/ML SOLUTION: Performed by: STUDENT IN AN ORGANIZED HEALTH CARE EDUCATION/TRAINING PROGRAM

## 2022-01-01 PROCEDURE — 84484 ASSAY OF TROPONIN QUANT: CPT | Performed by: EMERGENCY MEDICINE

## 2022-01-01 PROCEDURE — 87147 CULTURE TYPE IMMUNOLOGIC: CPT

## 2022-01-01 PROCEDURE — 25010000002 HEPARIN (PORCINE) PER 1000 UNITS: Performed by: STUDENT IN AN ORGANIZED HEALTH CARE EDUCATION/TRAINING PROGRAM

## 2022-01-01 PROCEDURE — 92950 HEART/LUNG RESUSCITATION CPR: CPT

## 2022-01-01 PROCEDURE — 25010000002 PHENYLEPHRINE 10 MG/ML SOLUTION 5 ML VIAL

## 2022-01-01 PROCEDURE — 0 POTASSIUM CHLORIDE 10 MEQ/100ML SOLUTION: Performed by: STUDENT IN AN ORGANIZED HEALTH CARE EDUCATION/TRAINING PROGRAM

## 2022-01-01 PROCEDURE — 85610 PROTHROMBIN TIME: CPT

## 2022-01-01 PROCEDURE — 87449 NOS EACH ORGANISM AG IA: CPT | Performed by: NURSE PRACTITIONER

## 2022-01-01 PROCEDURE — 25010000002 MIDAZOLAM PER 1 MG: Performed by: STUDENT IN AN ORGANIZED HEALTH CARE EDUCATION/TRAINING PROGRAM

## 2022-01-01 PROCEDURE — 25010000002 EPINEPHRINE 1 MG/10ML SOLUTION PREFILLED SYRINGE: Performed by: EMERGENCY MEDICINE

## 2022-01-01 PROCEDURE — 86900 BLOOD TYPING SEROLOGIC ABO: CPT

## 2022-01-01 PROCEDURE — 81001 URINALYSIS AUTO W/SCOPE: CPT | Performed by: NURSE PRACTITIONER

## 2022-01-01 PROCEDURE — 99231 SBSQ HOSP IP/OBS SF/LOW 25: CPT | Performed by: NURSE PRACTITIONER

## 2022-01-01 PROCEDURE — 86901 BLOOD TYPING SEROLOGIC RH(D): CPT

## 2022-01-01 PROCEDURE — 87040 BLOOD CULTURE FOR BACTERIA: CPT

## 2022-01-01 PROCEDURE — P9047 ALBUMIN (HUMAN), 25%, 50ML: HCPCS

## 2022-01-01 PROCEDURE — 99285 EMERGENCY DEPT VISIT HI MDM: CPT

## 2022-01-01 PROCEDURE — 93306 TTE W/DOPPLER COMPLETE: CPT

## 2022-01-01 PROCEDURE — P9016 RBC LEUKOCYTES REDUCED: HCPCS

## 2022-01-01 PROCEDURE — 5A1945Z RESPIRATORY VENTILATION, 24-96 CONSECUTIVE HOURS: ICD-10-PCS | Performed by: INTERNAL MEDICINE

## 2022-01-01 PROCEDURE — 74177 CT ABD & PELVIS W/CONTRAST: CPT

## 2022-01-01 PROCEDURE — 83735 ASSAY OF MAGNESIUM: CPT | Performed by: STUDENT IN AN ORGANIZED HEALTH CARE EDUCATION/TRAINING PROGRAM

## 2022-01-01 PROCEDURE — 85014 HEMATOCRIT: CPT | Performed by: INTERNAL MEDICINE

## 2022-01-01 PROCEDURE — 25010000002 ALBUMIN HUMAN 5% PER 50 ML: Performed by: STUDENT IN AN ORGANIZED HEALTH CARE EDUCATION/TRAINING PROGRAM

## 2022-01-01 PROCEDURE — 76937 US GUIDE VASCULAR ACCESS: CPT

## 2022-01-01 PROCEDURE — 99231 SBSQ HOSP IP/OBS SF/LOW 25: CPT | Performed by: SURGERY

## 2022-01-01 PROCEDURE — 80053 COMPREHEN METABOLIC PANEL: CPT | Performed by: EMERGENCY MEDICINE

## 2022-01-01 PROCEDURE — 81001 URINALYSIS AUTO W/SCOPE: CPT

## 2022-01-01 PROCEDURE — P9045 ALBUMIN (HUMAN), 5%, 250 ML: HCPCS | Performed by: STUDENT IN AN ORGANIZED HEALTH CARE EDUCATION/TRAINING PROGRAM

## 2022-01-01 PROCEDURE — 36415 COLL VENOUS BLD VENIPUNCTURE: CPT

## 2022-01-01 PROCEDURE — 0 POTASSIUM CHLORIDE 10 MEQ/100ML SOLUTION

## 2022-01-01 PROCEDURE — 87102 FUNGUS ISOLATION CULTURE: CPT | Performed by: INTERNAL MEDICINE

## 2022-01-01 PROCEDURE — 63710000001 INSULIN LISPRO (HUMAN) PER 5 UNITS

## 2022-01-01 PROCEDURE — 85018 HEMOGLOBIN: CPT | Performed by: INTERNAL MEDICINE

## 2022-01-01 PROCEDURE — 74018 RADEX ABDOMEN 1 VIEW: CPT

## 2022-01-01 PROCEDURE — 25010000002 CEFEPIME PER 500 MG: Performed by: INTERNAL MEDICINE

## 2022-01-01 PROCEDURE — 86850 RBC ANTIBODY SCREEN: CPT

## 2022-01-01 PROCEDURE — 0 IOPAMIDOL PER 1 ML: Performed by: INTERNAL MEDICINE

## 2022-01-01 PROCEDURE — 70450 CT HEAD/BRAIN W/O DYE: CPT

## 2022-01-01 PROCEDURE — 25010000002 CALCIUM GLUCONATE 2-0.675 GM/100ML-% SOLUTION

## 2022-01-01 PROCEDURE — 25010000002 DIPHENHYDRAMINE PER 50 MG

## 2022-01-01 PROCEDURE — 25010000002 ALBUMIN HUMAN 25% PER 50 ML

## 2022-01-01 PROCEDURE — 93306 TTE W/DOPPLER COMPLETE: CPT | Performed by: INTERNAL MEDICINE

## 2022-01-01 RX ORDER — ALBUMIN, HUMAN INJ 5% 5 %
500 SOLUTION INTRAVENOUS ONCE
Status: COMPLETED | OUTPATIENT
Start: 2022-01-01 | End: 2022-01-01

## 2022-01-01 RX ORDER — GLYCOPYRROLATE 0.2 MG/ML
0.2 INJECTION INTRAMUSCULAR; INTRAVENOUS
Status: DISCONTINUED | OUTPATIENT
Start: 2022-01-01 | End: 2022-07-25 | Stop reason: HOSPADM

## 2022-01-01 RX ORDER — SODIUM CHLORIDE 0.9 % (FLUSH) 0.9 %
10 SYRINGE (ML) INJECTION EVERY 12 HOURS SCHEDULED
Status: DISCONTINUED | OUTPATIENT
Start: 2022-01-01 | End: 2022-01-01

## 2022-01-01 RX ORDER — GABAPENTIN 300 MG/1
300 CAPSULE ORAL 3 TIMES DAILY
COMMUNITY

## 2022-01-01 RX ORDER — METOPROLOL TARTRATE 5 MG/5ML
5 INJECTION INTRAVENOUS EVERY 6 HOURS PRN
Status: DISCONTINUED | OUTPATIENT
Start: 2022-01-01 | End: 2022-01-01

## 2022-01-01 RX ORDER — POTASSIUM CHLORIDE 1.5 G/1.77G
40 POWDER, FOR SOLUTION ORAL AS NEEDED
Status: DISCONTINUED | OUTPATIENT
Start: 2022-01-01 | End: 2022-01-01

## 2022-01-01 RX ORDER — SODIUM CHLORIDE 9 MG/ML
INJECTION, SOLUTION INTRAVENOUS
Status: COMPLETED | OUTPATIENT
Start: 2022-01-01 | End: 2022-01-01

## 2022-01-01 RX ORDER — EPINEPHRINE 0.1 MG/ML
SYRINGE (ML) INJECTION
Status: COMPLETED | OUTPATIENT
Start: 2022-01-01 | End: 2022-01-01

## 2022-01-01 RX ORDER — INSULIN LISPRO 100 [IU]/ML
0-7 INJECTION, SOLUTION INTRAVENOUS; SUBCUTANEOUS EVERY 6 HOURS SCHEDULED
Status: DISCONTINUED | OUTPATIENT
Start: 2022-01-01 | End: 2022-01-01

## 2022-01-01 RX ORDER — HEPARIN SODIUM 5000 [USP'U]/ML
5000 INJECTION, SOLUTION INTRAVENOUS; SUBCUTANEOUS EVERY 8 HOURS SCHEDULED
Status: DISCONTINUED | OUTPATIENT
Start: 2022-01-01 | End: 2022-01-01

## 2022-01-01 RX ORDER — HYDROMORPHONE HYDROCHLORIDE 2 MG/1
2 TABLET ORAL 3 TIMES DAILY
COMMUNITY

## 2022-01-01 RX ORDER — LANOLIN ALCOHOL/MO/W.PET/CERES
3 CREAM (GRAM) TOPICAL NIGHTLY
COMMUNITY

## 2022-01-01 RX ORDER — SODIUM CHLORIDE 0.9 % (FLUSH) 0.9 %
20 SYRINGE (ML) INJECTION AS NEEDED
Status: DISCONTINUED | OUTPATIENT
Start: 2022-01-01 | End: 2022-01-01

## 2022-01-01 RX ORDER — DIPHENOXYLATE HYDROCHLORIDE AND ATROPINE SULFATE 2.5; .025 MG/1; MG/1
1 TABLET ORAL
Status: DISCONTINUED | OUTPATIENT
Start: 2022-01-01 | End: 2022-07-25 | Stop reason: HOSPADM

## 2022-01-01 RX ORDER — LANSOPRAZOLE
30 KIT EVERY MORNING
Status: DISCONTINUED | OUTPATIENT
Start: 2022-07-25 | End: 2022-01-01

## 2022-01-01 RX ORDER — LORAZEPAM 0.5 MG/1
0.5 TABLET ORAL 2 TIMES DAILY
COMMUNITY

## 2022-01-01 RX ORDER — AMIODARONE HYDROCHLORIDE 200 MG/1
200 TABLET ORAL EVERY 12 HOURS
Status: DISCONTINUED | OUTPATIENT
Start: 2022-07-28 | End: 2022-01-01

## 2022-01-01 RX ORDER — MAGNESIUM SULFATE 1 G/100ML
1 INJECTION INTRAVENOUS AS NEEDED
Status: DISCONTINUED | OUTPATIENT
Start: 2022-01-01 | End: 2022-01-01

## 2022-01-01 RX ORDER — CALCIUM GLUCONATE 20 MG/ML
2 INJECTION, SOLUTION INTRAVENOUS ONCE
Status: DISCONTINUED | OUTPATIENT
Start: 2022-01-01 | End: 2022-01-01

## 2022-01-01 RX ORDER — AMIODARONE HYDROCHLORIDE 200 MG/1
200 TABLET ORAL ONCE
Status: DISCONTINUED | OUTPATIENT
Start: 2022-01-01 | End: 2022-01-01

## 2022-01-01 RX ORDER — SODIUM CHLORIDE 0.9 % (FLUSH) 0.9 %
10 SYRINGE (ML) INJECTION AS NEEDED
Status: DISCONTINUED | OUTPATIENT
Start: 2022-01-01 | End: 2022-01-01

## 2022-01-01 RX ORDER — MAGNESIUM SULFATE HEPTAHYDRATE 40 MG/ML
2 INJECTION, SOLUTION INTRAVENOUS AS NEEDED
Status: DISCONTINUED | OUTPATIENT
Start: 2022-01-01 | End: 2022-01-01

## 2022-01-01 RX ORDER — GLYCOPYRROLATE 0.2 MG/ML
0.4 INJECTION INTRAMUSCULAR; INTRAVENOUS
Status: DISCONTINUED | OUTPATIENT
Start: 2022-01-01 | End: 2022-07-25 | Stop reason: HOSPADM

## 2022-01-01 RX ORDER — ONDANSETRON 2 MG/ML
4 INJECTION INTRAMUSCULAR; INTRAVENOUS EVERY 6 HOURS PRN
Status: DISCONTINUED | OUTPATIENT
Start: 2022-01-01 | End: 2022-01-01

## 2022-01-01 RX ORDER — METOPROLOL TARTRATE 5 MG/5ML
5 INJECTION INTRAVENOUS ONCE
Status: COMPLETED | OUTPATIENT
Start: 2022-01-01 | End: 2022-01-01

## 2022-01-01 RX ORDER — ACETAMINOPHEN 325 MG/1
650 TABLET ORAL EVERY 4 HOURS PRN
Status: DISCONTINUED | OUTPATIENT
Start: 2022-01-01 | End: 2022-01-01

## 2022-01-01 RX ORDER — AZATHIOPRINE 50 MG/1
50 TABLET ORAL NIGHTLY
COMMUNITY

## 2022-01-01 RX ORDER — NOREPINEPHRINE BIT/0.9 % NACL 8 MG/250ML
.02-.3 INFUSION BOTTLE (ML) INTRAVENOUS
Status: DISCONTINUED | OUTPATIENT
Start: 2022-01-01 | End: 2022-01-01

## 2022-01-01 RX ORDER — POTASSIUM CHLORIDE 7.45 MG/ML
10 INJECTION INTRAVENOUS
Status: DISCONTINUED | OUTPATIENT
Start: 2022-01-01 | End: 2022-01-01

## 2022-01-01 RX ORDER — POTASSIUM CHLORIDE 20 MEQ/1
40 TABLET, EXTENDED RELEASE ORAL AS NEEDED
Status: DISCONTINUED | OUTPATIENT
Start: 2022-01-01 | End: 2022-01-01

## 2022-01-01 RX ORDER — METOPROLOL TARTRATE 5 MG/5ML
2.5 INJECTION INTRAVENOUS ONCE
Status: COMPLETED | OUTPATIENT
Start: 2022-01-01 | End: 2022-01-01

## 2022-01-01 RX ORDER — DIPHENHYDRAMINE HYDROCHLORIDE 50 MG/ML
50 INJECTION INTRAMUSCULAR; INTRAVENOUS ONCE
Status: COMPLETED | OUTPATIENT
Start: 2022-01-01 | End: 2022-01-01

## 2022-01-01 RX ORDER — AMMONIUM LACTATE 120 MG/G
1 CREAM TOPICAL NIGHTLY
COMMUNITY

## 2022-01-01 RX ORDER — CALCIUM GLUCONATE 20 MG/ML
2 INJECTION, SOLUTION INTRAVENOUS ONCE
Status: COMPLETED | OUTPATIENT
Start: 2022-01-01 | End: 2022-01-01

## 2022-01-01 RX ORDER — ACETAMINOPHEN 160 MG/5ML
650 SOLUTION ORAL EVERY 4 HOURS PRN
Status: DISCONTINUED | OUTPATIENT
Start: 2022-01-01 | End: 2022-07-25 | Stop reason: HOSPADM

## 2022-01-01 RX ORDER — ACETAMINOPHEN 650 MG/1
650 SUPPOSITORY RECTAL EVERY 4 HOURS PRN
Status: DISCONTINUED | OUTPATIENT
Start: 2022-01-01 | End: 2022-07-25 | Stop reason: HOSPADM

## 2022-01-01 RX ORDER — SERTRALINE HYDROCHLORIDE 100 MG/1
100 TABLET, FILM COATED ORAL DAILY
COMMUNITY

## 2022-01-01 RX ORDER — ECHINACEA PURPUREA EXTRACT 125 MG
2 TABLET ORAL AS NEEDED
Status: DISCONTINUED | OUTPATIENT
Start: 2022-01-01 | End: 2022-07-25 | Stop reason: HOSPADM

## 2022-01-01 RX ORDER — ACETAMINOPHEN 650 MG/1
650 SUPPOSITORY RECTAL EVERY 4 HOURS PRN
Status: DISCONTINUED | OUTPATIENT
Start: 2022-01-01 | End: 2022-01-01

## 2022-01-01 RX ORDER — ACETAMINOPHEN 325 MG/1
650 TABLET ORAL EVERY 4 HOURS PRN
Status: DISCONTINUED | OUTPATIENT
Start: 2022-01-01 | End: 2022-07-25 | Stop reason: HOSPADM

## 2022-01-01 RX ORDER — INSULIN LISPRO 100 [IU]/ML
0-7 INJECTION, SOLUTION INTRAVENOUS; SUBCUTANEOUS AS NEEDED
Status: DISCONTINUED | OUTPATIENT
Start: 2022-01-01 | End: 2022-01-01

## 2022-01-01 RX ORDER — DEXTROSE MONOHYDRATE 25 G/50ML
INJECTION, SOLUTION INTRAVENOUS
Status: COMPLETED | OUTPATIENT
Start: 2022-01-01 | End: 2022-01-01

## 2022-01-01 RX ORDER — MIDAZOLAM HYDROCHLORIDE 1 MG/ML
2 INJECTION INTRAMUSCULAR; INTRAVENOUS
Status: DISCONTINUED | OUTPATIENT
Start: 2022-01-01 | End: 2022-07-25 | Stop reason: HOSPADM

## 2022-01-01 RX ORDER — PANTOPRAZOLE SODIUM 40 MG/10ML
40 INJECTION, POWDER, LYOPHILIZED, FOR SOLUTION INTRAVENOUS
Status: DISCONTINUED | OUTPATIENT
Start: 2022-01-01 | End: 2022-01-01

## 2022-01-01 RX ORDER — METHYLPREDNISOLONE SODIUM SUCCINATE 40 MG/ML
40 INJECTION, POWDER, LYOPHILIZED, FOR SOLUTION INTRAMUSCULAR; INTRAVENOUS EVERY 4 HOURS
Status: COMPLETED | OUTPATIENT
Start: 2022-01-01 | End: 2022-01-01

## 2022-01-01 RX ORDER — DEXTROSE AND SODIUM CHLORIDE 5; .45 G/100ML; G/100ML
100 INJECTION, SOLUTION INTRAVENOUS CONTINUOUS
Status: DISCONTINUED | OUTPATIENT
Start: 2022-01-01 | End: 2022-01-01

## 2022-01-01 RX ORDER — CARBOXYMETHYLCELLULOSE SODIUM 10 MG/ML
1 GEL OPHTHALMIC
Status: DISCONTINUED | OUTPATIENT
Start: 2022-01-01 | End: 2022-07-25 | Stop reason: HOSPADM

## 2022-01-01 RX ORDER — SODIUM CHLORIDE 9 MG/ML
150 INJECTION, SOLUTION INTRAVENOUS CONTINUOUS
Status: DISCONTINUED | OUTPATIENT
Start: 2022-01-01 | End: 2022-01-01

## 2022-01-01 RX ORDER — ALUMINA, MAGNESIA, AND SIMETHICONE 2400; 2400; 240 MG/30ML; MG/30ML; MG/30ML
15 SUSPENSION ORAL EVERY 6 HOURS PRN
Status: DISCONTINUED | OUTPATIENT
Start: 2022-01-01 | End: 2022-01-01

## 2022-01-01 RX ORDER — ALBUMIN (HUMAN) 12.5 G/50ML
50 SOLUTION INTRAVENOUS ONCE
Status: COMPLETED | OUTPATIENT
Start: 2022-01-01 | End: 2022-01-01

## 2022-01-01 RX ORDER — ONDANSETRON 4 MG/1
4 TABLET, FILM COATED ORAL EVERY 6 HOURS PRN
Status: DISCONTINUED | OUTPATIENT
Start: 2022-01-01 | End: 2022-01-01

## 2022-01-01 RX ORDER — FERROUS SULFATE 300 MG/5ML
325 LIQUID (ML) ORAL DAILY
COMMUNITY

## 2022-01-01 RX ORDER — ENOXAPARIN SODIUM 100 MG/ML
1 INJECTION SUBCUTANEOUS EVERY 12 HOURS
Status: DISCONTINUED | OUTPATIENT
Start: 2022-01-01 | End: 2022-01-01

## 2022-01-01 RX ORDER — AZATHIOPRINE 50 MG/1
100 TABLET ORAL DAILY
COMMUNITY

## 2022-01-01 RX ORDER — POTASSIUM CHLORIDE 7.45 MG/ML
10 INJECTION INTRAVENOUS
Status: COMPLETED | OUTPATIENT
Start: 2022-01-01 | End: 2022-01-01

## 2022-01-01 RX ORDER — AMIODARONE HYDROCHLORIDE 200 MG/1
200 TABLET ORAL DAILY
Status: DISCONTINUED | OUTPATIENT
Start: 2022-08-12 | End: 2022-01-01

## 2022-01-01 RX ORDER — AMIODARONE HYDROCHLORIDE 200 MG/1
200 TABLET ORAL EVERY 8 HOURS
Status: DISCONTINUED | OUTPATIENT
Start: 2022-01-01 | End: 2022-01-01

## 2022-01-01 RX ADMIN — Medication 10 ML: at 08:37

## 2022-01-01 RX ADMIN — PANTOPRAZOLE SODIUM 40 MG: 40 INJECTION, POWDER, FOR SOLUTION INTRAVENOUS at 09:51

## 2022-01-01 RX ADMIN — Medication 10 ML: at 20:03

## 2022-01-01 RX ADMIN — Medication 10 ML: at 20:32

## 2022-01-01 RX ADMIN — HYDROCORTISONE SODIUM SUCCINATE 100 MG: 100 INJECTION, POWDER, FOR SOLUTION INTRAMUSCULAR; INTRAVENOUS at 01:05

## 2022-01-01 RX ADMIN — HYDROMORPHONE HYDROCHLORIDE 2 MG: 1 INJECTION, SOLUTION INTRAMUSCULAR; INTRAVENOUS; SUBCUTANEOUS at 20:14

## 2022-01-01 RX ADMIN — HYDROCORTISONE SODIUM SUCCINATE 100 MG: 100 INJECTION, POWDER, FOR SOLUTION INTRAMUSCULAR; INTRAVENOUS at 08:34

## 2022-01-01 RX ADMIN — POTASSIUM CHLORIDE 40 MEQ: 1.5 POWDER, FOR SOLUTION ORAL at 16:36

## 2022-01-01 RX ADMIN — EPINEPHRINE 1 MG: 0.1 INJECTION, SOLUTION ENDOTRACHEAL; INTRACARDIAC; INTRAVENOUS at 10:32

## 2022-01-01 RX ADMIN — DEXTROSE AND SODIUM CHLORIDE 100 ML/HR: 5; 450 INJECTION, SOLUTION INTRAVENOUS at 17:23

## 2022-01-01 RX ADMIN — Medication 10 ML: at 20:00

## 2022-01-01 RX ADMIN — Medication 0.35 MCG/KG/MIN: at 17:39

## 2022-01-01 RX ADMIN — POTASSIUM CHLORIDE 10 MEQ: 7.46 INJECTION, SOLUTION INTRAVENOUS at 07:36

## 2022-01-01 RX ADMIN — Medication 10 ML: at 08:45

## 2022-01-01 RX ADMIN — EPINEPHRINE 1 MG: 0.1 INJECTION, SOLUTION ENDOTRACHEAL; INTRACARDIAC; INTRAVENOUS at 10:53

## 2022-01-01 RX ADMIN — Medication 10 ML: at 08:05

## 2022-01-01 RX ADMIN — POTASSIUM CHLORIDE 10 MEQ: 7.46 INJECTION, SOLUTION INTRAVENOUS at 09:40

## 2022-01-01 RX ADMIN — CEFTRIAXONE 2 G: 2 INJECTION, POWDER, FOR SOLUTION INTRAMUSCULAR; INTRAVENOUS at 10:10

## 2022-01-01 RX ADMIN — DIPHENHYDRAMINE HYDROCHLORIDE 50 MG: 50 INJECTION, SOLUTION INTRAMUSCULAR; INTRAVENOUS at 15:16

## 2022-01-01 RX ADMIN — CALCIUM GLUCONATE 2 G: 20 INJECTION, SOLUTION INTRAVENOUS at 18:40

## 2022-01-01 RX ADMIN — Medication 10 ML: at 20:33

## 2022-01-01 RX ADMIN — CEFEPIME HYDROCHLORIDE 2 G: 2 INJECTION, POWDER, FOR SOLUTION INTRAVENOUS at 15:38

## 2022-01-01 RX ADMIN — POTASSIUM CHLORIDE 10 MEQ: 7.46 INJECTION, SOLUTION INTRAVENOUS at 10:42

## 2022-01-01 RX ADMIN — HYDROCORTISONE SODIUM SUCCINATE 100 MG: 100 INJECTION, POWDER, FOR SOLUTION INTRAMUSCULAR; INTRAVENOUS at 13:15

## 2022-01-01 RX ADMIN — HYDROCORTISONE SODIUM SUCCINATE 100 MG: 100 INJECTION, POWDER, FOR SOLUTION INTRAMUSCULAR; INTRAVENOUS at 20:00

## 2022-01-01 RX ADMIN — SODIUM CHLORIDE 1000 ML: 9 INJECTION, SOLUTION INTRAVENOUS at 12:05

## 2022-01-01 RX ADMIN — ALBUMIN (HUMAN) 50 G: 0.25 INJECTION, SOLUTION INTRAVENOUS at 13:23

## 2022-01-01 RX ADMIN — HEPARIN SODIUM 5000 UNITS: 5000 INJECTION INTRAVENOUS; SUBCUTANEOUS at 05:06

## 2022-01-01 RX ADMIN — METHYLPREDNISOLONE SODIUM SUCCINATE 40 MG: 40 INJECTION, POWDER, FOR SOLUTION INTRAMUSCULAR; INTRAVENOUS at 15:17

## 2022-01-01 RX ADMIN — PANTOPRAZOLE SODIUM 40 MG: 40 INJECTION, POWDER, FOR SOLUTION INTRAVENOUS at 05:28

## 2022-01-01 RX ADMIN — METHYLPREDNISOLONE SODIUM SUCCINATE 40 MG: 40 INJECTION, POWDER, FOR SOLUTION INTRAMUSCULAR; INTRAVENOUS at 19:57

## 2022-01-01 RX ADMIN — Medication 10 ML: at 20:21

## 2022-01-01 RX ADMIN — ALBUMIN (HUMAN) 500 ML: 2.5 SOLUTION INTRAVENOUS at 00:43

## 2022-01-01 RX ADMIN — Medication 10 ML: at 08:06

## 2022-01-01 RX ADMIN — HYDROCORTISONE SODIUM SUCCINATE 100 MG: 100 INJECTION, POWDER, FOR SOLUTION INTRAMUSCULAR; INTRAVENOUS at 01:04

## 2022-01-01 RX ADMIN — SODIUM CHLORIDE 150 ML/HR: 9 INJECTION, SOLUTION INTRAVENOUS at 12:22

## 2022-01-01 RX ADMIN — SODIUM CHLORIDE 1000 ML: 0.9 INJECTION, SOLUTION INTRAVENOUS at 10:39

## 2022-01-01 RX ADMIN — HYDROCORTISONE SODIUM SUCCINATE 100 MG: 100 INJECTION, POWDER, FOR SOLUTION INTRAMUSCULAR; INTRAVENOUS at 08:36

## 2022-01-01 RX ADMIN — CALCIUM GLUCONATE 2 G: 20 INJECTION, SOLUTION INTRAVENOUS at 13:48

## 2022-01-01 RX ADMIN — POTASSIUM CHLORIDE 10 MEQ: 7.46 INJECTION, SOLUTION INTRAVENOUS at 05:29

## 2022-01-01 RX ADMIN — HYDROCORTISONE SODIUM SUCCINATE 100 MG: 100 INJECTION, POWDER, FOR SOLUTION INTRAMUSCULAR; INTRAVENOUS at 16:20

## 2022-01-01 RX ADMIN — SODIUM CHLORIDE 500 ML: 9 INJECTION, SOLUTION INTRAVENOUS at 10:40

## 2022-01-01 RX ADMIN — MAGNESIUM SULFATE 1 G: 1 INJECTION INTRAVENOUS at 05:29

## 2022-01-01 RX ADMIN — Medication 10 ML: at 08:34

## 2022-01-01 RX ADMIN — DEXTROSE AND SODIUM CHLORIDE 100 ML/HR: 5; 450 INJECTION, SOLUTION INTRAVENOUS at 17:40

## 2022-01-01 RX ADMIN — PANTOPRAZOLE SODIUM 40 MG: 40 INJECTION, POWDER, FOR SOLUTION INTRAVENOUS at 06:35

## 2022-01-01 RX ADMIN — PANTOPRAZOLE SODIUM 40 MG: 40 INJECTION, POWDER, FOR SOLUTION INTRAVENOUS at 05:54

## 2022-01-01 RX ADMIN — HYDROCORTISONE SODIUM SUCCINATE 100 MG: 100 INJECTION, POWDER, FOR SOLUTION INTRAMUSCULAR; INTRAVENOUS at 13:22

## 2022-01-01 RX ADMIN — HYDROCORTISONE SODIUM SUCCINATE 100 MG: 100 INJECTION, POWDER, FOR SOLUTION INTRAMUSCULAR; INTRAVENOUS at 20:21

## 2022-01-01 RX ADMIN — HYDROCORTISONE SODIUM SUCCINATE 100 MG: 100 INJECTION, POWDER, FOR SOLUTION INTRAMUSCULAR; INTRAVENOUS at 01:29

## 2022-01-01 RX ADMIN — POTASSIUM CHLORIDE 10 MEQ: 7.46 INJECTION, SOLUTION INTRAVENOUS at 08:33

## 2022-01-01 RX ADMIN — HYDROCORTISONE SODIUM SUCCINATE 100 MG: 100 INJECTION, POWDER, FOR SOLUTION INTRAMUSCULAR; INTRAVENOUS at 02:17

## 2022-01-01 RX ADMIN — Medication 0.45 MCG/KG/MIN: at 04:09

## 2022-01-01 RX ADMIN — HYDROCORTISONE SODIUM SUCCINATE 100 MG: 100 INJECTION, POWDER, FOR SOLUTION INTRAMUSCULAR; INTRAVENOUS at 07:47

## 2022-01-01 RX ADMIN — IBUPROFEN 800 MG: 100 SUSPENSION ORAL at 00:58

## 2022-01-01 RX ADMIN — METOPROLOL TARTRATE 2.5 MG: 1 INJECTION, SOLUTION INTRAVENOUS at 00:00

## 2022-01-01 RX ADMIN — DEXTROSE AND SODIUM CHLORIDE 100 ML/HR: 5; 450 INJECTION, SOLUTION INTRAVENOUS at 12:10

## 2022-01-01 RX ADMIN — HYDROCORTISONE SODIUM SUCCINATE 100 MG: 100 INJECTION, POWDER, FOR SOLUTION INTRAMUSCULAR; INTRAVENOUS at 14:48

## 2022-01-01 RX ADMIN — SODIUM CHLORIDE 1000 ML: 0.9 INJECTION, SOLUTION INTRAVENOUS at 10:46

## 2022-01-01 RX ADMIN — CEFTRIAXONE 2 G: 2 INJECTION, POWDER, FOR SOLUTION INTRAMUSCULAR; INTRAVENOUS at 08:36

## 2022-01-01 RX ADMIN — POTASSIUM CHLORIDE 40 MEQ: 1.5 POWDER, FOR SOLUTION ORAL at 19:57

## 2022-01-01 RX ADMIN — HYDROCORTISONE SODIUM SUCCINATE 100 MG: 100 INJECTION, POWDER, FOR SOLUTION INTRAMUSCULAR; INTRAVENOUS at 16:03

## 2022-01-01 RX ADMIN — Medication 10 ML: at 20:02

## 2022-01-01 RX ADMIN — Medication 0.45 MCG/KG/MIN: at 23:00

## 2022-01-01 RX ADMIN — INSULIN LISPRO 2 UNITS: 100 INJECTION, SOLUTION INTRAVENOUS; SUBCUTANEOUS at 17:12

## 2022-01-01 RX ADMIN — HYDROCORTISONE SODIUM SUCCINATE 100 MG: 100 INJECTION, POWDER, FOR SOLUTION INTRAMUSCULAR; INTRAVENOUS at 20:32

## 2022-01-01 RX ADMIN — DEXTROSE MONOHYDRATE 25 G: 25 INJECTION, SOLUTION INTRAVENOUS at 10:29

## 2022-01-01 RX ADMIN — IOPAMIDOL 100 ML: 755 INJECTION, SOLUTION INTRAVENOUS at 15:53

## 2022-01-01 RX ADMIN — CEFTRIAXONE 2 G: 2 INJECTION, POWDER, FOR SOLUTION INTRAMUSCULAR; INTRAVENOUS at 08:33

## 2022-01-01 RX ADMIN — DEXTROSE AND SODIUM CHLORIDE 100 ML/HR: 5; 450 INJECTION, SOLUTION INTRAVENOUS at 09:51

## 2022-01-01 RX ADMIN — METOPROLOL TARTRATE 5 MG: 1 INJECTION, SOLUTION INTRAVENOUS at 05:54

## 2022-01-01 RX ADMIN — METOPROLOL TARTRATE 5 MG: 1 INJECTION, SOLUTION INTRAVENOUS at 13:22

## 2022-01-01 RX ADMIN — Medication 0.4 MCG/KG/MIN: at 08:09

## 2022-01-01 RX ADMIN — MIDAZOLAM 2 MG: 1 INJECTION, SOLUTION INTRAMUSCULAR; INTRAVENOUS at 20:15

## 2022-01-01 RX ADMIN — Medication 10 ML: at 20:22

## 2022-01-01 RX ADMIN — HYDROCORTISONE SODIUM SUCCINATE 100 MG: 100 INJECTION, POWDER, FOR SOLUTION INTRAMUSCULAR; INTRAVENOUS at 08:02

## 2022-01-01 RX ADMIN — Medication 0.1 MCG/KG/MIN: at 12:21

## 2022-01-01 RX ADMIN — PHENYLEPHRINE HYDROCHLORIDE 0.5 MCG/KG/MIN: 10 INJECTION INTRAVENOUS at 13:34

## 2022-01-01 RX ADMIN — HYDROCORTISONE SODIUM SUCCINATE 100 MG: 100 INJECTION, POWDER, FOR SOLUTION INTRAMUSCULAR; INTRAVENOUS at 19:57

## 2022-01-01 RX ADMIN — Medication 0.08 MCG/KG/MIN: at 03:49

## 2022-01-01 RX ADMIN — EPINEPHRINE 1 MG: 0.1 INJECTION, SOLUTION ENDOTRACHEAL; INTRACARDIAC; INTRAVENOUS at 10:27

## 2022-01-01 RX ADMIN — GLYCOPYRROLATE 0.4 MG: 0.2 INJECTION INTRAMUSCULAR; INTRAVENOUS at 20:15

## 2022-01-01 RX ADMIN — METHYLPREDNISOLONE SODIUM SUCCINATE 40 MG: 40 INJECTION, POWDER, FOR SOLUTION INTRAMUSCULAR; INTRAVENOUS at 23:01

## 2022-01-01 RX ADMIN — CEFTRIAXONE 2 G: 2 INJECTION, POWDER, FOR SOLUTION INTRAMUSCULAR; INTRAVENOUS at 09:51

## 2022-01-01 RX ADMIN — Medication 10 ML: at 20:01

## 2022-01-01 RX ADMIN — EPINEPHRINE 1 MG: 0.1 INJECTION, SOLUTION ENDOTRACHEAL; INTRACARDIAC; INTRAVENOUS at 10:28

## 2022-01-01 RX ADMIN — EPINEPHRINE 1 MG: 0.1 INJECTION, SOLUTION ENDOTRACHEAL; INTRACARDIAC; INTRAVENOUS at 11:05

## 2022-01-01 RX ADMIN — MAGNESIUM SULFATE 1 G: 1 INJECTION INTRAVENOUS at 16:32

## 2022-07-20 PROBLEM — R71.8 ELEVATED HEMATOCRIT: Status: ACTIVE | Noted: 2022-01-01

## 2022-07-20 PROBLEM — F41.9 ANXIETY: Status: ACTIVE | Noted: 2020-03-13

## 2022-07-20 PROBLEM — R00.0 SINUS TACHYCARDIA: Status: ACTIVE | Noted: 2022-01-01

## 2022-07-20 PROBLEM — R63.30 FEEDING DIFFICULTY: Status: ACTIVE | Noted: 2022-01-01

## 2022-07-20 PROBLEM — K31.84 GASTROPARESIS: Status: ACTIVE | Noted: 2022-01-01

## 2022-07-20 PROBLEM — M10.9 GOUT: Status: ACTIVE | Noted: 2022-01-01

## 2022-07-20 PROBLEM — Z91.041 CONTRAST MEDIA ALLERGY: Status: ACTIVE | Noted: 2022-01-01

## 2022-07-20 PROBLEM — K21.9 GASTROESOPHAGEAL REFLUX DISEASE: Status: ACTIVE | Noted: 2022-01-01

## 2022-07-20 PROBLEM — Z86.010 HISTORY OF COLONIC POLYPS: Status: ACTIVE | Noted: 2022-01-01

## 2022-07-20 PROBLEM — I82.403 ACUTE DEEP VEIN THROMBOSIS (DVT) OF BOTH LOWER EXTREMITIES: Status: ACTIVE | Noted: 2022-01-01

## 2022-07-20 PROBLEM — I46.9 CARDIAC ARREST (HCC): Status: ACTIVE | Noted: 2022-01-01

## 2022-07-20 PROBLEM — K74.60 HEPATIC CIRRHOSIS (HCC): Status: ACTIVE | Noted: 2022-01-01

## 2022-07-20 PROBLEM — R77.8 ELEVATED TROPONIN LEVEL NOT DUE MYOCARDIAL INFARCTION: Status: ACTIVE | Noted: 2021-01-01

## 2022-07-20 PROBLEM — K76.0 STEATOSIS OF LIVER: Status: ACTIVE | Noted: 2022-01-01

## 2022-07-20 PROBLEM — K22.70 BARRETT'S ESOPHAGUS: Status: ACTIVE | Noted: 2022-01-01

## 2022-07-20 PROBLEM — I73.00 RAYNAUD'S DISEASE: Status: ACTIVE | Noted: 2022-01-01

## 2022-07-20 PROBLEM — I10 HYPERTENSION: Status: ACTIVE | Noted: 2022-01-01

## 2022-07-20 PROBLEM — R19.00 ABDOMINAL SWELLING: Status: ACTIVE | Noted: 2022-01-01

## 2022-07-20 PROBLEM — R30.0 DIFFICULT OR PAINFUL URINATION: Status: ACTIVE | Noted: 2022-01-01

## 2022-07-20 PROBLEM — M19.90 ARTHRITIS: Status: ACTIVE | Noted: 2022-01-01

## 2022-07-20 PROBLEM — J40 BRONCHITIS: Status: ACTIVE | Noted: 2022-01-01

## 2022-07-20 PROBLEM — L89.90 DECUBITUS ULCER: Status: ACTIVE | Noted: 2022-01-01

## 2022-07-20 PROBLEM — M51.37 DEGENERATION OF INTERVERTEBRAL DISC OF LUMBOSACRAL REGION: Status: ACTIVE | Noted: 2022-01-01

## 2022-07-20 PROBLEM — L89.154 PRESSURE INJURY OF SACRAL REGION, STAGE 4 (HCC): Status: ACTIVE | Noted: 2022-01-01

## 2022-07-25 LAB
BACTERIA ISLT: NORMAL
BACTERIA SPEC AEROBE CULT: NORMAL

## 2022-07-29 LAB
BACTERIA SPEC AEROBE CULT: NORMAL
BACTERIA SPEC AEROBE CULT: NORMAL

## 2022-08-03 LAB — QT INTERVAL: 315 MS

## (undated) DEVICE — SOL IRRG H2O PL/BG 1000ML STRL

## (undated) DEVICE — Device: Brand: DEFENDO AIR/WATER/SUCTION AND BIOPSY VALVE

## (undated) DEVICE — COLON KIT: Brand: MEDLINE INDUSTRIES, INC.

## (undated) DEVICE — SINGLE-USE BIOPSY FORCEPS: Brand: RADIAL JAW 4